# Patient Record
Sex: FEMALE | Race: WHITE | NOT HISPANIC OR LATINO | Employment: FULL TIME | URBAN - METROPOLITAN AREA
[De-identification: names, ages, dates, MRNs, and addresses within clinical notes are randomized per-mention and may not be internally consistent; named-entity substitution may affect disease eponyms.]

---

## 2021-04-27 ENCOUNTER — OFFICE VISIT (OUTPATIENT)
Dept: ENDOCRINOLOGY | Facility: HOSPITAL | Age: 46
End: 2021-04-27
Payer: COMMERCIAL

## 2021-04-27 VITALS
WEIGHT: 204.6 LBS | BODY MASS INDEX: 36.25 KG/M2 | HEART RATE: 104 BPM | HEIGHT: 63 IN | DIASTOLIC BLOOD PRESSURE: 80 MMHG | SYSTOLIC BLOOD PRESSURE: 120 MMHG

## 2021-04-27 DIAGNOSIS — E03.9 ACQUIRED HYPOTHYROIDISM: Primary | ICD-10-CM

## 2021-04-27 DIAGNOSIS — R63.5 WEIGHT GAIN: ICD-10-CM

## 2021-04-27 DIAGNOSIS — E55.9 VITAMIN D DEFICIENCY: ICD-10-CM

## 2021-04-27 DIAGNOSIS — Z86.39 HISTORY OF THYROID NODULE: ICD-10-CM

## 2021-04-27 DIAGNOSIS — L91.8 MULTIPLE ACQUIRED SKIN TAGS: ICD-10-CM

## 2021-04-27 PROBLEM — I10 ESSENTIAL HYPERTENSION: Status: ACTIVE | Noted: 2021-04-27

## 2021-04-27 PROBLEM — E78.2 MIXED HYPERLIPIDEMIA: Status: ACTIVE | Noted: 2021-04-27

## 2021-04-27 PROCEDURE — 99205 OFFICE O/P NEW HI 60 MIN: CPT | Performed by: INTERNAL MEDICINE

## 2021-04-27 RX ORDER — LOSARTAN POTASSIUM 50 MG/1
50 TABLET ORAL DAILY
COMMUNITY
Start: 2021-04-13

## 2021-04-27 RX ORDER — MOMETASONE FUROATE 50 UG/1
SPRAY, METERED NASAL EVERY 24 HOURS
COMMUNITY

## 2021-04-27 RX ORDER — MAGNESIUM 200 MG
TABLET ORAL DAILY
COMMUNITY

## 2021-04-27 RX ORDER — CHLORAL HYDRATE 500 MG
1000 CAPSULE ORAL DAILY
COMMUNITY

## 2021-04-27 RX ORDER — ZINC GLUCONATE 50 MG
50 TABLET ORAL DAILY
COMMUNITY

## 2021-04-27 RX ORDER — THYROID 60 MG
TABLET ORAL
COMMUNITY
Start: 2021-01-29 | End: 2021-05-19 | Stop reason: ALTCHOICE

## 2021-04-27 RX ORDER — BUDESONIDE AND FORMOTEROL FUMARATE DIHYDRATE 160; 4.5 UG/1; UG/1
2 AEROSOL RESPIRATORY (INHALATION) 2 TIMES DAILY
COMMUNITY

## 2021-04-27 RX ORDER — CHOLECALCIFEROL (VITAMIN D3) 125 MCG
2000 CAPSULE ORAL DAILY
COMMUNITY
End: 2021-05-19 | Stop reason: SDUPTHER

## 2021-04-27 NOTE — PROGRESS NOTES
4/28/2021    Assessment/Plan      Diagnoses and all orders for this visit:    Acquired hypothyroidism  -     HEMOGLOBIN A1C W/ EAG ESTIMATION Lab Collect; Future  -     Comprehensive metabolic panel Lab Collect; Future  -     TSH, 3rd generation Lab Collect; Future  -     T4, free Lab Collect; Future  -     Vitamin D 25 hydroxy Lab Collect; Future  -     Insulin, fasting- Lab Collect; Future  -     T3, free; Future  -     Anti-microsomal antibody  -     Anti-thyroglobulin antibody  -     US thyroid; Future    Vitamin D deficiency  -     HEMOGLOBIN A1C W/ EAG ESTIMATION Lab Collect; Future  -     Comprehensive metabolic panel Lab Collect; Future  -     TSH, 3rd generation Lab Collect; Future  -     T4, free Lab Collect; Future  -     Vitamin D 25 hydroxy Lab Collect; Future  -     Insulin, fasting- Lab Collect; Future  -     T3, free; Future  -     Anti-microsomal antibody  -     Anti-thyroglobulin antibody    Weight gain  -     HEMOGLOBIN A1C W/ EAG ESTIMATION Lab Collect; Future  -     Comprehensive metabolic panel Lab Collect; Future  -     TSH, 3rd generation Lab Collect; Future  -     T4, free Lab Collect; Future  -     Vitamin D 25 hydroxy Lab Collect; Future  -     Insulin, fasting- Lab Collect; Future  -     T3, free; Future  -     Anti-microsomal antibody  -     Anti-thyroglobulin antibody    Multiple acquired skin tags  -     HEMOGLOBIN A1C W/ EAG ESTIMATION Lab Collect; Future  -     Comprehensive metabolic panel Lab Collect; Future  -     TSH, 3rd generation Lab Collect; Future  -     T4, free Lab Collect; Future  -     Vitamin D 25 hydroxy Lab Collect; Future  -     Insulin, fasting- Lab Collect; Future  -     T3, free; Future  -     Anti-microsomal antibody  -     Anti-thyroglobulin antibody    History of thyroid nodule  -     US thyroid;  Future    Other orders  -     [place] Thyroid 60 MG tablet; TAKE 1 TABLET BY MOUTH EVERY DAY AND ONE EXTRA TABLET ONE DAY A WEEK  -     albuterol (PROVENTIL HFA,VENTOLIN HFA) 90 mcg/act inhaler  -     mometasone (Nasonex) 50 mcg/act nasal spray; every 24 hours  -     budesonide-formoterol (SYMBICORT) 160-4 5 mcg/act inhaler; Inhale 2 puffs 2 (two) times a day Rinse mouth after use  -     Cholecalciferol (Vitamin D3) 50 MCG (2000 UT) TABS; Take 2,000 Units by mouth daily  -     losartan (COZAAR) 25 mg tablet; Take 25 mg by mouth daily   -     Multiple Vitamins-Minerals (MULTIVITAMIN WOMEN PO); Take by mouth daily  -     COLLAGEN PO; Take by mouth Powder daily  -     Omega-3 Fatty Acids (fish oil) 1,000 mg; Take 1,000 mg by mouth daily  -     Lactobacillus (PROBIOTIC ACIDOPHILUS PO); Take by mouth daily  -     Magnesium 200 MG TABS; Take by mouth daily  -     zinc gluconate 50 mg tablet; Take 50 mg by mouth daily  -     Garlic 502 MG TABS; Take by mouth daily        Assessment/Plan:  1  Hypothyroidism  Last thyroid blood work was normal but she is now taking only [place] Thyroid 60 mg 1 tablet daily instead of the extra tablet once a week  She is still having hypothyroid symptoms  I will repeat thyroid function tests with thyroid antibodies now  I reassured her that some of her symptoms may not have anything to do with the thyroid as there are multiple other causes for the same symptoms including insulin resistance and vitamin-D deficiency  We discussed possibility of adjusting her thyroid hormone to brand name Synthroid her Tirosint alone or with Cytomel / liothyronine in addition or continuing on [place] Thyroid and adjusting dosage  2  Vitamin-D deficiency  I will check a vitamin-D level now as she has not had a level checked in quite some time  This can cause hair loss  3  Multiple acquired skin tags and weight gain  Given her family history of type 2 diabetes, she could have insulin resistance and I will evaluate for that problem  Metformin could be used in the future if she does indeed have insulin resistance    For now, she will continue with the nutritionist to see if that will help her lose weight and continue to work on exercise  Of note, her lipid profile does demonstrate high triglycerides with a low HDL consistent with insulin resistance and the metabolic syndrome  4  History of thyroid nodule  She reportedly had a thyroid nodule on ultrasound for which biopsy was attempted in the past   The biopsy was inconclusive so I will repeat a thyroid ultrasound at her earliest convenience  She will get blood work done now consisting of a hemoglobin A1c, fasting CMP with insulin level, TSH, free T4, free T3, antimicrosomal antibodies and antithyroglobulin antibodies, and 25 hydroxy vitamin-D level  She will also get a thyroid ultrasound done at her earliest convenience  CC:   Hypothyroid, vitamin-D deficiency, weight gain consult    History of Present Illness     HPI: Yoan Mendoza is a 39y o  year old female with history of  Hypothyroidism reportedly due to Hashimoto's thyroiditis, vitamin-D deficiency, continued hypothyroid symptoms here for evaluation /consult  She was diagnosed with a thyroid nodule around 2011 or so  This was noted by an ENT  A biopsy was performed which was inconclusive in repeat thyroid ultrasounds demonstrated the nodule had resolved  She had repeat ultrasounds but has not had 1 in quite some time  She was continuing to feel poorly and was diagnosed with hypothyroidism about 5 years or so ago  At that time, she was complaining of fatigue, hair loss, weight gain, nails not growing  She had another primary care physician who originally did the aforementioned ultrasound  When she changed primary care physicians, blood work was done and she was told she had hypothyroidism and Hashimoto's thyroiditis and was told to see an endocrinologist who placed her on thyroid hormone  She started brand-name Synthroid but never felt quite good about 2 years ago she switched [place] Thyroid    She still had continued symptoms and vitamin-D deficiency was noted  She was placed on high-dose vitamin-D for 3 months but has not continue taking vitamin-D regularly  She is supposed to be taking vitamin-D 2000 units daily  She also started collagen and that seems to have helped her eyelashes loss and her hair loss  She is here for transition of care from her previous endocrinologist     She felt that when she went on [place] Thyroid she may have had an increase in eyelashes falling out and weight gain  She would have cycles of hypothyroid symptoms that would resolve after several months and the cycles could include rashes on her arms and her side and her head with hair loss and that would improve  Of note her gynecologist ran tests in June 2020 and TSH was elevated  At that time, she was told increase her [place] Thyroid to add an extra tablet once a week but she discontinued this when her thyroid function tests were normal in January 2021  She has a family history of hypothyroidism in her brother and possibly her mother and type 2 diabetes in a maternal grandmother  She admits to gaining at least 40 lb in the last 4-5 years  She has started working with a nutritionist to try to lose weight  She has also been walking her dogs at least 2 miles a day  Unfortunately, she has frequent sinus infections and ends up utilizing a prednisone course every few months for either her wheezing or her rash  She continues to be fatigued and run down  She does have sleeping issues in that she will go to bed between 11 p m  and 12:00 a m  and then wake up early and be unable to get back to sleep  The blood told her her memory is poor  She denies anxiety or depression but can feel shaky at times  This often will occur after meals  She can get very cold but can also be sweaty when her neck and have warm feelings at night  She has rare heart racing  She denies diarrhea or constipation    She has continued hair loss and more hair breaking and hair loss along with eyelashes loss  She has brittle nails and dry skin and has noticed some increase in skin tags  Menstrual cycles are occurring about every 28 days but are quite heavy  She will have some spotting for 2 weeks before after her menses  She denies diplopia  She denies compressive thyroid symptoms or difficulties with swallowing  She has no history of head or neck irradiation in the past     Review of Systems   Constitutional: Positive for fatigue and unexpected weight change  Has started to see a nutritionist now  weight continues to climb despite dieting although not always the healthiest  Now walking dogs 2 miles a day  Always fatigued and run down  At least 40 lbs in the last 4-5 years  HENT: Negative for hearing loss, tinnitus and trouble swallowing  Has frequent sinus infections requiring prednisone usage  No XRT  To the head and neck int he past    Eyes: Negative for visual disturbance  No diplopia  Respiratory: Negative for chest tightness and shortness of breath  Cardiovascular: Negative for chest pain, palpitations and leg swelling  Rare heart racing  Gastrointestinal: Negative for abdominal pain, constipation, diarrhea and nausea  Now has GERD  Endocrine: Negative for cold intolerance and heat intolerance  Feet can get very cold  Will be sweaty around the neck and some warm feelings at night  Genitourinary:        Menses regular every 28 days but much heavier  Will start seeing 2 weeks of spotting after menses ends, had recent pelvic ultrasound  Musculoskeletal: Negative for arthralgias and back pain  Skin: Positive for rash  Getting skin tags recently around the base of the neck  Skin rashes can occur and certain things can cause rash if wrong deodorant  Has dry skin  Has brittle nails  Has hair loss and eye lash loss  Neurological: Positive for tremors and headaches  Negative for dizziness, light-headedness and numbness  Feeling more shaky at times  Has chronic headaches  Psychiatric/Behavioral: Positive for confusion and sleep disturbance  Negative for dysphoric mood  The patient is not nervous/anxious  Must wait until 11-12 to go to sleep or will wake up in the early morning  Will notice a strange noise when she lays on back to breath  Uses a"Breatherite" strip to try to open nose a bit  People have told her memory is bad  Historical Information   Past Medical History:   Diagnosis Date    Asthma     induced by exercise or smoke   Deviated septum     GERD (gastroesophageal reflux disease)     Nasal polyps      Past Surgical History:   Procedure Laterality Date     SECTION      X 2     Social History   Social History     Substance and Sexual Activity   Alcohol Use Never    Frequency: Never     Social History     Substance and Sexual Activity   Drug Use Never     Social History     Tobacco Use   Smoking Status Never Smoker   Smokeless Tobacco Never Used     Family History:   Family History   Problem Relation Age of Onset    Hypertension Mother     Hashimoto's thyroiditis Mother     Hypertension Father     Sleep apnea Father     Hypertension Brother     Thyroid disease unspecified Brother     Sleep apnea Brother     Hyperlipidemia Brother     Autism spectrum disorder Brother         suspected    No Known Problems Son     No Known Problems Daughter     Diabetes type II Maternal Grandmother        Meds/Allergies   Current Outpatient Medications   Medication Sig Dispense Refill    albuterol (PROVENTIL HFA,VENTOLIN HFA) 90 mcg/act inhaler       [place] Thyroid 60 MG tablet TAKE 1 TABLET BY MOUTH EVERY DAY AND ONE EXTRA TABLET ONE DAY A WEEK      budesonide-formoterol (SYMBICORT) 160-4 5 mcg/act inhaler Inhale 2 puffs 2 (two) times a day Rinse mouth after use        Cholecalciferol (Vitamin D3) 50 MCG (2000 UT) TABS Take 2,000 Units by mouth daily      COLLAGEN PO Take by mouth Powder daily      Garlic 534 MG TABS Take by mouth daily      Lactobacillus (PROBIOTIC ACIDOPHILUS PO) Take by mouth daily      losartan (COZAAR) 25 mg tablet Take 25 mg by mouth daily       Magnesium 200 MG TABS Take by mouth daily      mometasone (Nasonex) 50 mcg/act nasal spray every 24 hours      Multiple Vitamins-Minerals (MULTIVITAMIN WOMEN PO) Take by mouth daily      Omega-3 Fatty Acids (fish oil) 1,000 mg Take 1,000 mg by mouth daily      zinc gluconate 50 mg tablet Take 50 mg by mouth daily       No current facility-administered medications for this visit  Allergies   Allergen Reactions    Sulfamethoxazole-Trimethoprim Hives       Objective   Vitals: Blood pressure 120/80, pulse 104, height 5' 2 5" (1 588 m), weight 92 8 kg (204 lb 9 6 oz)  Invasive Devices     None                 Physical Exam  Vitals signs reviewed  Constitutional:       Appearance: Normal appearance  She is well-developed  She is obese  HENT:      Head: Normocephalic and atraumatic  Eyes:      Extraocular Movements: Extraocular movements intact  Conjunctiva/sclera: Conjunctivae normal       Comments: No lid lag, stare, proptosis, or periorbital edema  Neck:      Musculoskeletal: Normal range of motion and neck supple  Thyroid: No thyromegaly  Vascular: No carotid bruit  Comments: No supraclavicular fat pad or buffalo hump  Thyroid normal in size  No palpable thyroid nodules  Cardiovascular:      Rate and Rhythm: Normal rate and regular rhythm  Heart sounds: Normal heart sounds  No murmur  Pulmonary:      Effort: Pulmonary effort is normal       Breath sounds: Normal breath sounds  No wheezing  Abdominal:      General: Bowel sounds are normal       Palpations: Abdomen is soft  Tenderness: There is no abdominal tenderness  Musculoskeletal: Normal range of motion  General: No deformity  Right lower leg: No edema  Left lower leg: No edema        Comments: No tremor of the outstretched hands  No spinous process tenderness  No CVA tenderness  No tremor of the outstretched hands  Lymphadenopathy:      Cervical: No cervical adenopathy  Skin:     General: Skin is warm and dry  Findings: No rash  Comments: Acanthosis nigricans in the nape of her neck  Skin tags in the nape of her neck  No violaceous striae  Neurological:      Mental Status: She is alert and oriented to person, place, and time  Deep Tendon Reflexes: Reflexes are normal and symmetric  Comments: Deep tendon reflexes normal          The history was obtained from the review of the chart and from the patient  Lab Results:        Blood work done on 01/07/2021 showed a TSH of 2 45, total T4 4 5, T3 uptake 25%,  FTI 1 1  CBC was normal with a ferritin that is low normal at 26  TIBC and iron levels are normal      twenty-five hydroxy vitamin-D level is 21 4  Total cholesterol 230, triglyceride 406, HDL 30, LDL seventy-three  CMP showed a glucose of 96 fasting with a calcium of 9 4 but was otherwise normal       No future appointments

## 2021-04-27 NOTE — PATIENT INSTRUCTIONS
Let's get blood work done now for the thyroid and the possible insulin resistance  Let's do blood work now ,   Continue with the nutritionist   For now, continue the [place] thyroid 60 mg daily  Follow up to be determined

## 2021-05-17 LAB
25(OH)D3+25(OH)D2 SERPL-MCNC: 22 NG/ML (ref 30–100)
ALBUMIN SERPL-MCNC: 4.4 G/DL (ref 3.8–4.8)
ALBUMIN/GLOB SERPL: 1.8 {RATIO} (ref 1.2–2.2)
ALP SERPL-CCNC: 65 IU/L (ref 39–117)
ALT SERPL-CCNC: 17 IU/L (ref 0–32)
AST SERPL-CCNC: 21 IU/L (ref 0–40)
BILIRUB SERPL-MCNC: 0.6 MG/DL (ref 0–1.2)
BUN SERPL-MCNC: 14 MG/DL (ref 6–24)
BUN/CREAT SERPL: 21 (ref 9–23)
CALCIUM SERPL-MCNC: 9.1 MG/DL (ref 8.7–10.2)
CHLORIDE SERPL-SCNC: 104 MMOL/L (ref 96–106)
CO2 SERPL-SCNC: 22 MMOL/L (ref 20–29)
CREAT SERPL-MCNC: 0.66 MG/DL (ref 0.57–1)
EST. AVERAGE GLUCOSE BLD GHB EST-MCNC: 108 MG/DL
GLOBULIN SER-MCNC: 2.5 G/DL (ref 1.5–4.5)
GLUCOSE SERPL-MCNC: 86 MG/DL (ref 65–99)
HBA1C MFR BLD: 5.4 % (ref 4.8–5.6)
INSULIN SERPL-ACNC: 31 UIU/ML (ref 2.6–24.9)
POTASSIUM SERPL-SCNC: 4.2 MMOL/L (ref 3.5–5.2)
PROT SERPL-MCNC: 6.9 G/DL (ref 6–8.5)
SL AMB EGFR AFRICAN AMERICAN: 123 ML/MIN/1.73
SL AMB EGFR NON AFRICAN AMERICAN: 107 ML/MIN/1.73
SODIUM SERPL-SCNC: 138 MMOL/L (ref 134–144)
T3FREE SERPL-MCNC: 4.5 PG/ML (ref 2–4.4)
T4 FREE SERPL-MCNC: 0.89 NG/DL (ref 0.82–1.77)
THYROGLOB AB SERPL-ACNC: 10.5 IU/ML (ref 0–0.9)
THYROPEROXIDASE AB SERPL-ACNC: 351 IU/ML (ref 0–34)
TSH SERPL DL<=0.005 MIU/L-ACNC: 4.16 UIU/ML (ref 0.45–4.5)

## 2021-05-18 ENCOUNTER — TELEPHONE (OUTPATIENT)
Dept: ENDOCRINOLOGY | Facility: HOSPITAL | Age: 46
End: 2021-05-18

## 2021-05-18 NOTE — TELEPHONE ENCOUNTER
We should have her mckinley an appointment with me to discuss results and treatment as there are many abnormalities and treatment options to discuss

## 2021-05-19 ENCOUNTER — TELEMEDICINE (OUTPATIENT)
Dept: ENDOCRINOLOGY | Facility: HOSPITAL | Age: 46
End: 2021-05-19
Payer: COMMERCIAL

## 2021-05-19 DIAGNOSIS — E03.8 HYPOTHYROIDISM DUE TO HASHIMOTO'S THYROIDITIS: Primary | ICD-10-CM

## 2021-05-19 DIAGNOSIS — E06.3 HYPOTHYROIDISM DUE TO HASHIMOTO'S THYROIDITIS: Primary | ICD-10-CM

## 2021-05-19 DIAGNOSIS — E55.9 VITAMIN D DEFICIENCY: ICD-10-CM

## 2021-05-19 DIAGNOSIS — E88.81 INSULIN RESISTANCE: ICD-10-CM

## 2021-05-19 PROBLEM — E88.819 INSULIN RESISTANCE: Status: ACTIVE | Noted: 2021-05-19

## 2021-05-19 PROCEDURE — 99214 OFFICE O/P EST MOD 30 MIN: CPT | Performed by: INTERNAL MEDICINE

## 2021-05-19 RX ORDER — OMEPRAZOLE 20 MG/1
20 CAPSULE, DELAYED RELEASE ORAL DAILY
COMMUNITY

## 2021-05-19 RX ORDER — LEVOTHYROXINE SODIUM 100 MCG
100 TABLET ORAL DAILY
Qty: 90 TABLET | Refills: 3 | Status: SHIPPED | OUTPATIENT
Start: 2021-05-19 | End: 2022-06-09

## 2021-05-19 RX ORDER — CHOLECALCIFEROL (VITAMIN D3) 125 MCG
CAPSULE ORAL
Start: 2021-05-19 | End: 2021-08-30 | Stop reason: SDUPTHER

## 2021-05-19 RX ORDER — LIOTHYRONINE SODIUM 5 UG/1
5 TABLET ORAL DAILY
Qty: 90 TABLET | Refills: 3 | Status: SHIPPED | OUTPATIENT
Start: 2021-05-19 | End: 2022-06-09

## 2021-05-19 NOTE — PATIENT INSTRUCTIONS
The thyroid is on the underactive side  Let's change to brand synthroid 100 mcg daily and cytomel/liothyronine 5 mcg daily  Let's increase the vitamin D to 3331-2883 units daily  Work on diet, exercise, and weight loss  Follow up in 3 months with blood work

## 2021-05-19 NOTE — PROGRESS NOTES
Virtual Regular Visit      Assessment/Plan:    Problem List Items Addressed This Visit        Endocrine    Hypothyroidism due to Hashimoto's thyroiditis - Primary    Relevant Medications    Synthroid 100 MCG tablet    liothyronine (CYTOMEL) 5 mcg tablet    Other Relevant Orders    T4, free Lab Collect    Comprehensive metabolic panel Lab Collect    TSH, 3rd generation Lab Collect    Vitamin D 25 hydroxy Lab Collect    T3, free    Lipid Panel with Direct LDL reflex Lab Collect    Insulin resistance    Relevant Orders    T4, free Lab Collect    Comprehensive metabolic panel Lab Collect    TSH, 3rd generation Lab Collect    Vitamin D 25 hydroxy Lab Collect    T3, free    Lipid Panel with Direct LDL reflex Lab Collect       Other    Vitamin D deficiency    Relevant Medications    Cholecalciferol (Vitamin D3) 50 MCG (2000 UT) TABS    Other Relevant Orders    T4, free Lab Collect    Comprehensive metabolic panel Lab Collect    TSH, 3rd generation Lab Collect    Vitamin D 25 hydroxy Lab Collect    T3, free    Lipid Panel with Direct LDL reflex Lab Collect          Assessment and plan:     1  Hypothyroidism  She has hypothyroidism on the basis of Hashimoto's thyroiditis as evidence by her positive thyroid antibodies  She is currently on [place] Thyroid but not feeling well and has not felt well on thyroid hormone replacement since she started thyroid hormone  Her TSH is in the high end of normal which is in the hypothyroid side of normal and perhaps this is the reason  We are going to try switching her thyroid hormone regimen and change to a T4/T3 combination with separate the pills to see if we can adjust each individual component of her thyroid hormone replacement individually  To that end, I will switch her to brand name Synthroid 100 mcg daily and Cytomel 5 mcg daily    ( of note, by calculation of weight, she may need Synthroid 125 mcg daily but we discussed this in air to on the side of slightly lower so she does not have hyperthyroid symptoms  )    2  Vitamin-D deficiency  She continues to have a low vitamin-D and is taking intermittent vitamin-D 2000 units daily  She was asked to increase her vitamin-D to 5137-1694 units daily  3  Insulin resistance  Based on her blood work, she does have insulin resistance which could be partly due to her weight  We discussed changes in habits by restricting carbohydrates and portions  Working on exercising more  We also discussed briefly that she could try metformin but we have elected to hold on doing this at this point as we have elected to try adjusting her thyroid hormone 1st which may help in her weight loss  I have asked her to follow up in 3 months with preceding CMP, lipid panel, TSH, free T4, free T3, and 25 hydroxy vitamin-D level  100% of the visit was spent in discussing her testing results, potential treatments, and answering all of her questions  Reason for visit is   Chief Complaint   Patient presents with    Virtual Regular Visit     And hypothyroidism, insulin resistance, vitamin-D deficiency follow-up    Encounter provider Anali Ramos MD    Provider located at [address] [place] [address] [address] [place] [phone]      Recent Visits  Date Type Provider Dept   05/19/21 Telemedicine Anali Ramos MD [facility]   05/18/21 Telephone Nancy Torres [facility] Emy richardson   Showing recent visits within past 7 days and meeting all other requirements     Future Appointments  No visits were found meeting these conditions  Showing future appointments within next 150 days and meeting all other requirements        The patient was identified by name and date of birth  João Aguilar was informed that this is a telemedicine visit and that the visit is being conducted through telephone    My office door was closed  No one else was in the room  She acknowledged consent and understanding of privacy and security of the video platform  The patient has agreed to participate and understands they can discontinue the visit at any time  Patient is aware this is a billable service  Shae Benavides is a 39 y o  female with a history of hypothyroidism and vitamin-D deficiency along with weight gain for follow-up visit via telephone  telemedicine   She has this visit to go over the testing results performed for the above problems  She reports that she has had thyroid hormone replacement with brand-name Synthroid in the past in [place] Thyroid but has never felt good on the medications and feels no different  She has also tried vitamin-D replacement and has felt no different  HPI     Past Medical History:   Diagnosis Date    Asthma     induced by exercise or smoke   Deviated septum     GERD (gastroesophageal reflux disease)     Nasal polyps        Past Surgical History:   Procedure Laterality Date     SECTION      X 2       Current Outpatient Medications   Medication Sig Dispense Refill    albuterol (PROVENTIL HFA,VENTOLIN HFA) 90 mcg/act inhaler       budesonide-formoterol (SYMBICORT) 160-4 5 mcg/act inhaler Inhale 2 puffs 2 (two) times a day Rinse mouth after use        Cholecalciferol (Vitamin D3) 50 MCG (2000 UT) TABS 4320-4792 units daily      COLLAGEN PO Take by mouth Powder daily      Garlic 398 MG TABS Take by mouth daily      Lactobacillus (PROBIOTIC ACIDOPHILUS PO) Take by mouth daily      losartan (COZAAR) 25 mg tablet Take 25 mg by mouth daily       Magnesium 200 MG TABS Take by mouth daily      mometasone (Nasonex) 50 mcg/act nasal spray every 24 hours      Multiple Vitamins-Minerals (MULTIVITAMIN WOMEN PO) Take by mouth daily      Omega-3 Fatty Acids (fish oil) 1,000 mg Take 1,000 mg by mouth daily      omeprazole (PriLOSEC) 20 mg delayed release capsule Take 20 mg by mouth daily      zinc gluconate 50 mg tablet Take 50 mg by mouth daily      liothyronine (CYTOMEL) 5 mcg tablet Take 1 tablet (5 mcg total) by mouth daily 90 tablet 3    Synthroid 100 MCG tablet Take 1 tablet (100 mcg total) by mouth daily 90 tablet 3     No current facility-administered medications for this visit  Allergies   Allergen Reactions    Sulfamethoxazole-Trimethoprim Hives       Review of Systems    Video Exam  It was my intent to perform this visit via video technology but the patient was not able to do a video connection so the visit was completed via audio telephone only  There were no vitals filed for this visit  Physical Exam was not performed as this was a telephone telemedicine visit  Blood work:      Blood work done on 05/14/2021 showed a TSH of 4 16 with a free T4 of  0 89 and a free T3 of  4 5  Thyroglobulin antibodies are 10 5 and thyroid microsomal antibodies are 351  CMP showed a glucose of 86 fasting but was otherwise normal   Fasting insulin level was elevated at 31  Hemoglobin A1c is 5 4%  Twenty-five hydroxy vitamin-D level is 22  I spent 35 minutes directly with the patient during this visit      VIRTUAL VISIT DISCLAIMER    Galilea Raymundo acknowledges that she has consented to an online visit or consultation  She understands that the online visit is based solely on information provided by her, and that, in the absence of a face-to-face physical evaluation by the physician, the diagnosis she receives is both limited and provisional in terms of accuracy and completeness  This is not intended to replace a full medical face-to-face evaluation by the physician  Galilea Raymundo understands and accepts these terms

## 2021-08-30 ENCOUNTER — TELEMEDICINE (OUTPATIENT)
Dept: ENDOCRINOLOGY | Facility: HOSPITAL | Age: 46
End: 2021-08-30
Payer: COMMERCIAL

## 2021-08-30 DIAGNOSIS — E03.8 HYPOTHYROIDISM DUE TO HASHIMOTO'S THYROIDITIS: Primary | ICD-10-CM

## 2021-08-30 DIAGNOSIS — E55.9 VITAMIN D DEFICIENCY: ICD-10-CM

## 2021-08-30 DIAGNOSIS — E06.3 HYPOTHYROIDISM DUE TO HASHIMOTO'S THYROIDITIS: Primary | ICD-10-CM

## 2021-08-30 DIAGNOSIS — E88.81 INSULIN RESISTANCE: ICD-10-CM

## 2021-08-30 LAB
25(OH)D3+25(OH)D2 SERPL-MCNC: 24.9 NG/ML (ref 30–100)
ALBUMIN SERPL-MCNC: 4.4 G/DL (ref 3.8–4.8)
ALBUMIN/GLOB SERPL: 1.8 {RATIO} (ref 1.2–2.2)
ALP SERPL-CCNC: 71 IU/L (ref 48–121)
ALT SERPL-CCNC: 18 IU/L (ref 0–32)
AST SERPL-CCNC: 19 IU/L (ref 0–40)
BILIRUB SERPL-MCNC: 0.6 MG/DL (ref 0–1.2)
BUN SERPL-MCNC: 15 MG/DL (ref 6–24)
BUN/CREAT SERPL: 19 (ref 9–23)
CALCIUM SERPL-MCNC: 9.2 MG/DL (ref 8.7–10.2)
CHLORIDE SERPL-SCNC: 105 MMOL/L (ref 96–106)
CO2 SERPL-SCNC: 23 MMOL/L (ref 20–29)
CREAT SERPL-MCNC: 0.77 MG/DL (ref 0.57–1)
GLOBULIN SER-MCNC: 2.5 G/DL (ref 1.5–4.5)
GLUCOSE SERPL-MCNC: 84 MG/DL (ref 65–99)
POTASSIUM SERPL-SCNC: 4.1 MMOL/L (ref 3.5–5.2)
PROT SERPL-MCNC: 6.9 G/DL (ref 6–8.5)
SL AMB EGFR AFRICAN AMERICAN: 108 ML/MIN/1.73
SL AMB EGFR NON AFRICAN AMERICAN: 94 ML/MIN/1.73
SODIUM SERPL-SCNC: 140 MMOL/L (ref 134–144)
T3FREE SERPL-MCNC: 3.3 PG/ML (ref 2–4.4)
T4 FREE SERPL-MCNC: 1.35 NG/DL (ref 0.82–1.77)
TSH SERPL DL<=0.005 MIU/L-ACNC: 0.88 UIU/ML (ref 0.45–4.5)

## 2021-08-30 PROCEDURE — 99214 OFFICE O/P EST MOD 30 MIN: CPT | Performed by: INTERNAL MEDICINE

## 2021-08-30 RX ORDER — MULTIVIT-MIN/IRON/FOLIC ACID/K 18-600-40
CAPSULE ORAL DAILY
COMMUNITY

## 2021-08-30 RX ORDER — CHOLECALCIFEROL (VITAMIN D3) 125 MCG
CAPSULE ORAL
Start: 2021-08-30

## 2021-08-30 NOTE — PATIENT INSTRUCTIONS
The thyroid blood work is normal   I do not think this is the cause of your symptoms at this point  Continue the same Synthroid and liothyronine for few more months  Your vitamin-D level is still low which could be contributing to the fatigue in the hair loss  Lets increase the vitamin-D to between 6 and 8000 units a day, you could use liquid vitamin-D if he need to  Continue the vitamin-C with vitamin-D 1000 units also  Follow-up in 3 months with blood work  Remember to get plenty of exercise regularly and a lower carbohydrate diet

## 2021-08-30 NOTE — PROGRESS NOTES
Virtual Regular Visit    Verification of patient location:    Patient is located in the following state in which I hold an active license [place]      Assessment/Plan:    Problem List Items Addressed This Visit        Endocrine    Hypothyroidism due to Hashimoto's thyroiditis - Primary    Relevant Orders    HEMOGLOBIN A1C W/ EAG ESTIMATION Lab Collect    Comprehensive metabolic panel Lab Collect    TSH, 3rd generation Lab Collect    T4, free Lab Collect    Vitamin D 25 hydroxy Lab Collect    T3, free    Insulin, fasting- Lab Collect    Insulin resistance    Relevant Orders    HEMOGLOBIN A1C W/ EAG ESTIMATION Lab Collect    Comprehensive metabolic panel Lab Collect    TSH, 3rd generation Lab Collect    T4, free Lab Collect    Vitamin D 25 hydroxy Lab Collect    T3, free    Insulin, fasting- Lab Collect       Other    Vitamin D deficiency    Relevant Medications    Cholecalciferol (Vitamin D3) 50 MCG (2000 UT) TABS    Other Relevant Orders    HEMOGLOBIN A1C W/ EAG ESTIMATION Lab Collect    Comprehensive metabolic panel Lab Collect    TSH, 3rd generation Lab Collect    T4, free Lab Collect    Vitamin D 25 hydroxy Lab Collect    T3, free    Insulin, fasting- Lab Collect         Assessment and plan:     1  Hypothyroidism due to Hashimoto's thyroiditis  Most recent thyroid function tests are normal   Her TSH is where it needs to be for biochemical euthyroidism  She is starting to feel slightly improved with a little bit more energy and improved eyelashes but still has a lot of hypothyroid symptoms  I have asked her to give it a little bit more time since her thyroid function tests of finally normalized  For now, she will continue the same Synthroid brand 100 mcg daily and liothyronine 5 mcg daily      Of note, she had an event which sounds like a vasovagal response to some sort of GI upset whether it is from undiagnosed irritable bowel syndrome a or something she ate, she is going to discuss this with her primary care physician in the future  2  Vitamin-D deficiency  Her vitamin-D level is still low despite utilizing 9508-3752 units of vitamin-D daily  I have asked her to increase to 8087-5509 units vitamin-D daily and continue the vitamin-C with vitamin-D 1000 units in it  3  Insulin resistance  She needs to work on regular exercise and lower carbohydrate diet  I have asked her to follow up in 3 months with preceding fasting CMP, insulin level, hemoglobin A1c, 25 hydroxy vitamin-D level, TSH, free T4, and free T3  Reason for visit is   Chief Complaint   Patient presents with    Virtual Regular Visit      And hypothyroid, insulin resistance, vitamin-D deficiency follow-up    Encounter provider Pari Singleton MD    Provider located at [address] [place] [address] [address] [place] [id number]      Recent Visits  No visits were found meeting these conditions  Showing recent visits within past 7 days and meeting all other requirements  Today's Visits  Date Type Provider Dept   08/30/21 Telemedicine Pari Singleton MD [facility]   Showing today's visits and meeting all other requirements  Future Appointments  No visits were found meeting these conditions  Showing future appointments within next 150 days and meeting all other requirements       The patient was identified by name and date of birth  Lewis Kelsey was informed that this is a telemedicine visit and that the visit is being conducted through [facility] [place] and patient was informed that this is not a secure, HIPAA-compliant platform  She agrees to proceed     My office door was closed  No one else was in the room  She acknowledged consent and understanding of privacy and security of the video platform  The patient has agreed to participate and understands they can discontinue the visit at any time      Patient is aware this is a billable skylar Berger is a 39 y o  female with a history of hypothyroidism due to Hashimoto's thyroiditis, vitamin-D deficiency, and insulin resistance for follow-up visit via video telemedicine   She has hypothyroidism due to Hashimoto's thyroiditis diagnosed around 2011  She has been on thyroid hormone replacement and most recently was on Armor Thyroid 60 mg daily  She was switched from this to Synthroid and liothyronine combination and is currently on Synthroid 100 mcg daily and liothyronine 5 mcg daily since summer 2021  She has noticed an improvement in her eyelashes as they have grown again  She is a bit more energy once she gets going but still feels fatigued and thinks higher vitamin-D dosages seem to help  She still has generalized hair loss on the scalp and some dry skin but no brittle nails  She has feelings of being somewhat hot and sweaty and will often wake up sweating  She has rare palpitations with some heart rate variability in bed  She has anxiety which can wake her in the morning as she is stressed with COVID situation and her kids being in school  She denies cold intolerance, diarrhea or constipation, depression, or weight changes  She denies tremors  Menstrual cycles do normally occur on a regular monthly basis but over the last 2 cycles, the cycle has been longer in length  She has a history of thyroid nodules with biopsies done by ENT which were inconclusive in the nodules have since resolved on repeat ultrasounds  She has vitamin-D deficiency and takes vitamin-D 3227-1391 units a day with vitamin-C with a 1000 units daily for a total of 0722-4673 units daily  She generally feels that she has less fatigue when she takes more vitamin-D replacement  She has some insulin resistance and currently weight is stable  Menstrual cycles though have become longer in length      Of note, she reports an episode last week when she was outside standing and suddenly felt nauseated  She did not think she could make it home she felt she had to have a bowel movement so ended up stopping it [facility]  She had near loss of consciousness and dripping with sweat had a late down on the floor of the bathroom in [facility]  She then had severe diarrhea  It took about 15-20 minutes before resolved and then she felt fine thereafter  This is never happened to her before  HPI     Past Medical History:   Diagnosis Date    Asthma     induced by exercise or smoke   Deviated septum     GERD (gastroesophageal reflux disease)     Nasal polyps        Past Surgical History:   Procedure Laterality Date     SECTION      X 2       Current Outpatient Medications   Medication Sig Dispense Refill    albuterol (PROVENTIL HFA,VENTOLIN HFA) 90 mcg/act inhaler       Ascorbic Acid (Vitamin C) 500 MG CAPS Take by mouth daily With vitamin D 1000 units and zinc      budesonide-formoterol (SYMBICORT) 160-4 5 mcg/act inhaler Inhale 2 puffs 2 (two) times a day Rinse mouth after use        Cholecalciferol (Vitamin D3) 50 MCG (2000 UT) TABS 9441-2088 units daily      COLLAGEN PO Take by mouth Powder daily      Garlic 991 MG TABS Take by mouth daily      Lactobacillus (PROBIOTIC ACIDOPHILUS PO) Take by mouth daily      liothyronine (CYTOMEL) 5 mcg tablet Take 1 tablet (5 mcg total) by mouth daily 90 tablet 3    losartan (COZAAR) 25 mg tablet Take 25 mg by mouth daily       Magnesium 200 MG TABS Take by mouth daily      mometasone (Nasonex) 50 mcg/act nasal spray every 24 hours      Multiple Vitamins-Minerals (MULTIVITAMIN WOMEN PO) Take by mouth daily      Omega-3 Fatty Acids (fish oil) 1,000 mg Take 1,000 mg by mouth daily      omeprazole (PriLOSEC) 20 mg delayed release capsule Take 20 mg by mouth daily      Synthroid 100 MCG tablet Take 1 tablet (100 mcg total) by mouth daily 90 tablet 3    zinc gluconate 50 mg tablet Take 50 mg by mouth daily       No current facility-administered medications for this visit  Allergies   Allergen Reactions    Sulfamethoxazole-Trimethoprim Hives       Review of Systems   Constitutional: Positive for fatigue  Negative for unexpected weight change  Energy level slightly improved  This is particularly once she gets started in the morning  Still feels very fatigued but the fatigue is improved when she increases her vitamin-D replacement  HENT: Negative for trouble swallowing  Eyes: Negative for visual disturbance  No diplopia  Respiratory: Negative for chest tightness and shortness of breath  Cardiovascular: Positive for palpitations  Negative for chest pain  Feels heart rate is a bit higher and will have rare palpitations  Gastrointestinal: Positive for abdominal pain, diarrhea and nausea  Negative for constipation  Has some more diarrhea in general as been using magnesium more for restless leg syndrome symptoms  Endocrine: Positive for heat intolerance  Negative for cold intolerance  Feels more hot and sweaty in general and will occasionally wake up sweating  Genitourinary:        Menstrual cycles typically occur on a regular monthly basis  Last 2 menses have lasted a bit longer in flow  Skin: Positive for rash  Has significant hair loss till  Eyelashes though have started to grow and improved  Has dry skin  No brittle nails  Has a rash on the posterior scalp  Neurological: Positive for numbness and headaches  Negative for dizziness, tremors and light-headedness  Numbness of the left 5th toe  Always has a headache  Psychiatric/Behavioral: Positive for sleep disturbance  Negative for dysphoric mood  The patient is nervous/anxious  Anxiety which tends to wake her in the morning  Under lot of stress with the COVID situation and a kids being in school over the last month  Video Exam    There were no vitals filed for this visit      Physical Exam Physical Exam   Constitutional: She is oriented to person, place, and time  She appears well-developed and well-nourished  No distress  HENT:   No lid lag, stare, proptosis, or periorbital edema  No moon faces  Head: Normocephalic and atraumatic  Neck: Normal range of motion  No obvious thyroid enlargement  Pulmonary/Chest: Effort normal    No audible wheezing  Musculoskeletal: Normal range of motion  Neurological: She is alert and oriented to person, place, and time  Skin: She is not diaphoretic  Psychiatric: She has a normal mood and affect  Her behavior is normal      Blood work:     Blood work done on 08/27/2021 showed a TSH of 0 88 with a free T4 of 1 35 and a free T3 of 3 3  Twenty-five hydroxy vitamin-D level is 24 9  CMP showed a glucose of 84 but was otherwise normal        I spent 35 minutes directly with the patient during this visit    [id number] Kelli Pacheco verbally agrees to participate in [employer]  Pt is aware that [employer] could be limited without vital signs or the ability to perform a full hands-on physical Edythe Moritz understands she or the provider may request at any time to terminate the video visit and request the patient to seek care or treatment in person

## 2021-12-06 ENCOUNTER — OFFICE VISIT (OUTPATIENT)
Dept: ENDOCRINOLOGY | Facility: HOSPITAL | Age: 46
End: 2021-12-06
Payer: COMMERCIAL

## 2021-12-06 VITALS
SYSTOLIC BLOOD PRESSURE: 138 MMHG | WEIGHT: 207.8 LBS | HEIGHT: 63 IN | DIASTOLIC BLOOD PRESSURE: 86 MMHG | BODY MASS INDEX: 36.82 KG/M2 | HEART RATE: 90 BPM

## 2021-12-06 DIAGNOSIS — Z86.39 HISTORY OF THYROID NODULE: ICD-10-CM

## 2021-12-06 DIAGNOSIS — E03.8 HYPOTHYROIDISM DUE TO HASHIMOTO'S THYROIDITIS: Primary | ICD-10-CM

## 2021-12-06 DIAGNOSIS — L65.9 HAIR LOSS: ICD-10-CM

## 2021-12-06 DIAGNOSIS — R63.5 WEIGHT GAIN: ICD-10-CM

## 2021-12-06 DIAGNOSIS — E88.81 INSULIN RESISTANCE: ICD-10-CM

## 2021-12-06 DIAGNOSIS — E55.9 VITAMIN D DEFICIENCY: ICD-10-CM

## 2021-12-06 DIAGNOSIS — E06.3 HYPOTHYROIDISM DUE TO HASHIMOTO'S THYROIDITIS: Primary | ICD-10-CM

## 2021-12-06 LAB
25(OH)D3+25(OH)D2 SERPL-MCNC: 24.1 NG/ML (ref 30–100)
ALBUMIN SERPL-MCNC: 4.3 G/DL (ref 3.8–4.8)
ALBUMIN/GLOB SERPL: 1.7 {RATIO} (ref 1.2–2.2)
ALP SERPL-CCNC: 68 IU/L (ref 44–121)
ALT SERPL-CCNC: 25 IU/L (ref 0–32)
AST SERPL-CCNC: 26 IU/L (ref 0–40)
BILIRUB SERPL-MCNC: 0.7 MG/DL (ref 0–1.2)
BUN SERPL-MCNC: 15 MG/DL (ref 6–24)
BUN/CREAT SERPL: 20 (ref 9–23)
CALCIUM SERPL-MCNC: 9.1 MG/DL (ref 8.7–10.2)
CHLORIDE SERPL-SCNC: 102 MMOL/L (ref 96–106)
CO2 SERPL-SCNC: 22 MMOL/L (ref 20–29)
CREAT SERPL-MCNC: 0.76 MG/DL (ref 0.57–1)
EST. AVERAGE GLUCOSE BLD GHB EST-MCNC: 117 MG/DL
GLOBULIN SER-MCNC: 2.6 G/DL (ref 1.5–4.5)
GLUCOSE SERPL-MCNC: 85 MG/DL (ref 65–99)
HBA1C MFR BLD: 5.7 % (ref 4.8–5.6)
INSULIN SERPL-ACNC: 22.9 UIU/ML (ref 2.6–24.9)
POTASSIUM SERPL-SCNC: 4.1 MMOL/L (ref 3.5–5.2)
PROT SERPL-MCNC: 6.9 G/DL (ref 6–8.5)
SL AMB EGFR AFRICAN AMERICAN: 110 ML/MIN/1.73
SL AMB EGFR NON AFRICAN AMERICAN: 95 ML/MIN/1.73
SODIUM SERPL-SCNC: 138 MMOL/L (ref 134–144)
T3FREE SERPL-MCNC: 2.9 PG/ML (ref 2–4.4)
T4 FREE SERPL-MCNC: 0.95 NG/DL (ref 0.82–1.77)
TSH SERPL DL<=0.005 MIU/L-ACNC: 1.42 UIU/ML (ref 0.45–4.5)

## 2021-12-06 PROCEDURE — 99214 OFFICE O/P EST MOD 30 MIN: CPT | Performed by: INTERNAL MEDICINE

## 2021-12-06 RX ORDER — METFORMIN HYDROCHLORIDE 500 MG/1
500 TABLET, EXTENDED RELEASE ORAL 2 TIMES DAILY WITH MEALS
Qty: 180 TABLET | Refills: 2 | Status: SHIPPED | OUTPATIENT
Start: 2021-12-06

## 2022-03-24 LAB
ALBUMIN SERPL-MCNC: 4.5 G/DL (ref 3.8–4.8)
ALBUMIN/GLOB SERPL: 1.7 {RATIO} (ref 1.2–2.2)
ALP SERPL-CCNC: 69 IU/L (ref 44–121)
ALT SERPL-CCNC: 18 IU/L (ref 0–32)
AST SERPL-CCNC: 20 IU/L (ref 0–40)
BASOPHILS # BLD AUTO: 0.1 X10E3/UL (ref 0–0.2)
BASOPHILS NFR BLD AUTO: 1 %
BILIRUB SERPL-MCNC: 0.8 MG/DL (ref 0–1.2)
BUN SERPL-MCNC: 16 MG/DL (ref 6–24)
BUN/CREAT SERPL: 20 (ref 9–23)
CALCIUM SERPL-MCNC: 9.6 MG/DL (ref 8.7–10.2)
CHLORIDE SERPL-SCNC: 101 MMOL/L (ref 96–106)
CO2 SERPL-SCNC: 22 MMOL/L (ref 20–29)
CREAT SERPL-MCNC: 0.79 MG/DL (ref 0.57–1)
EGFR: 93 ML/MIN/1.73
EOSINOPHIL # BLD AUTO: 0.7 X10E3/UL (ref 0–0.4)
EOSINOPHIL NFR BLD AUTO: 9 %
ERYTHROCYTE [DISTWIDTH] IN BLOOD BY AUTOMATED COUNT: 13.4 % (ref 11.7–15.4)
EST. AVERAGE GLUCOSE BLD GHB EST-MCNC: 114 MG/DL
FERRITIN SERPL-MCNC: 14 NG/ML (ref 15–150)
GLOBULIN SER-MCNC: 2.6 G/DL (ref 1.5–4.5)
GLUCOSE SERPL-MCNC: 81 MG/DL (ref 65–99)
HBA1C MFR BLD: 5.6 % (ref 4.8–5.6)
HCT VFR BLD AUTO: 36.6 % (ref 34–46.6)
HGB BLD-MCNC: 12.4 G/DL (ref 11.1–15.9)
IMM GRANULOCYTES # BLD: 0 X10E3/UL (ref 0–0.1)
IMM GRANULOCYTES NFR BLD: 0 %
INSULIN SERPL-ACNC: 11.6 UIU/ML (ref 2.6–24.9)
LYMPHOCYTES # BLD AUTO: 3 X10E3/UL (ref 0.7–3.1)
LYMPHOCYTES NFR BLD AUTO: 37 %
MCH RBC QN AUTO: 27.4 PG (ref 26.6–33)
MCHC RBC AUTO-ENTMCNC: 33.9 G/DL (ref 31.5–35.7)
MCV RBC AUTO: 81 FL (ref 79–97)
MONOCYTES # BLD AUTO: 0.5 X10E3/UL (ref 0.1–0.9)
MONOCYTES NFR BLD AUTO: 7 %
NEUTROPHILS # BLD AUTO: 3.7 X10E3/UL (ref 1.4–7)
NEUTROPHILS NFR BLD AUTO: 46 %
PLATELET # BLD AUTO: 335 X10E3/UL (ref 150–450)
POTASSIUM SERPL-SCNC: 4.1 MMOL/L (ref 3.5–5.2)
PROT SERPL-MCNC: 7.1 G/DL (ref 6–8.5)
RBC # BLD AUTO: 4.52 X10E6/UL (ref 3.77–5.28)
SODIUM SERPL-SCNC: 138 MMOL/L (ref 134–144)
T3FREE SERPL-MCNC: 3.2 PG/ML (ref 2–4.4)
T4 FREE SERPL-MCNC: 1.07 NG/DL (ref 0.82–1.77)
TSH SERPL DL<=0.005 MIU/L-ACNC: 2.55 UIU/ML (ref 0.45–4.5)
WBC # BLD AUTO: 8 X10E3/UL (ref 3.4–10.8)

## 2022-03-30 ENCOUNTER — OFFICE VISIT (OUTPATIENT)
Dept: ENDOCRINOLOGY | Facility: HOSPITAL | Age: 47
End: 2022-03-30
Payer: COMMERCIAL

## 2022-03-30 VITALS
BODY MASS INDEX: 36.68 KG/M2 | HEART RATE: 98 BPM | OXYGEN SATURATION: 98 % | WEIGHT: 207 LBS | SYSTOLIC BLOOD PRESSURE: 112 MMHG | DIASTOLIC BLOOD PRESSURE: 78 MMHG | HEIGHT: 63 IN

## 2022-03-30 DIAGNOSIS — E88.81 INSULIN RESISTANCE: ICD-10-CM

## 2022-03-30 DIAGNOSIS — E06.3 HYPOTHYROIDISM DUE TO HASHIMOTO'S THYROIDITIS: Primary | ICD-10-CM

## 2022-03-30 DIAGNOSIS — R79.0 LOW FERRITIN: ICD-10-CM

## 2022-03-30 DIAGNOSIS — L65.9 HAIR LOSS: ICD-10-CM

## 2022-03-30 DIAGNOSIS — E03.8 HYPOTHYROIDISM DUE TO HASHIMOTO'S THYROIDITIS: Primary | ICD-10-CM

## 2022-03-30 DIAGNOSIS — E55.9 VITAMIN D DEFICIENCY: ICD-10-CM

## 2022-03-30 DIAGNOSIS — R63.5 WEIGHT GAIN: ICD-10-CM

## 2022-03-30 PROCEDURE — 99214 OFFICE O/P EST MOD 30 MIN: CPT | Performed by: INTERNAL MEDICINE

## 2022-03-30 NOTE — PATIENT INSTRUCTIONS
The thyroid blood work is good  Continue the same synthroid and liothyronine  The blood sugars and insulin levels are improved  Keep working on [employer] and exercise  You can take metformin if you wish, once daily for 2 weeks and then 2 daily  The ferritin is low showing low iron stores  Take iron 325 mg daily for the first month and then if constipated, drop down to 3 times a week  Make sure to take at least 3-4 hours away from the thyroid medicine  Follow up in 3 months with blood work

## 2022-06-09 DIAGNOSIS — E03.8 HYPOTHYROIDISM DUE TO HASHIMOTO'S THYROIDITIS: ICD-10-CM

## 2022-06-09 DIAGNOSIS — E06.3 HYPOTHYROIDISM DUE TO HASHIMOTO'S THYROIDITIS: ICD-10-CM

## 2022-06-09 RX ORDER — LIOTHYRONINE SODIUM 5 UG/1
TABLET ORAL
Qty: 90 TABLET | Refills: 3 | Status: SHIPPED | OUTPATIENT
Start: 2022-06-09

## 2022-06-09 RX ORDER — LEVOTHYROXINE SODIUM 100 MCG
TABLET ORAL
Qty: 90 TABLET | Refills: 3 | Status: SHIPPED | OUTPATIENT
Start: 2022-06-09

## 2023-02-09 ENCOUNTER — OFFICE VISIT (OUTPATIENT)
Dept: ENDOCRINOLOGY | Facility: HOSPITAL | Age: 48
End: 2023-02-09

## 2023-02-09 VITALS
SYSTOLIC BLOOD PRESSURE: 112 MMHG | DIASTOLIC BLOOD PRESSURE: 72 MMHG | OXYGEN SATURATION: 95 % | HEIGHT: 63 IN | WEIGHT: 203 LBS | HEART RATE: 104 BPM | BODY MASS INDEX: 35.97 KG/M2

## 2023-02-09 DIAGNOSIS — E03.8 HYPOTHYROIDISM DUE TO HASHIMOTO'S THYROIDITIS: Primary | ICD-10-CM

## 2023-02-09 DIAGNOSIS — E06.3 HYPOTHYROIDISM DUE TO HASHIMOTO'S THYROIDITIS: Primary | ICD-10-CM

## 2023-02-09 DIAGNOSIS — E78.5 HYPERLIPIDEMIA, UNSPECIFIED HYPERLIPIDEMIA TYPE: ICD-10-CM

## 2023-02-09 DIAGNOSIS — E88.81 INSULIN RESISTANCE: ICD-10-CM

## 2023-02-09 DIAGNOSIS — R53.83 OTHER FATIGUE: ICD-10-CM

## 2023-02-09 RX ORDER — ATORVASTATIN CALCIUM 10 MG/1
10 TABLET, FILM COATED ORAL DAILY
Qty: 90 TABLET | Refills: 1 | Status: SHIPPED | OUTPATIENT
Start: 2023-02-09

## 2023-02-09 NOTE — PROGRESS NOTES
Jamie Ponce 52 y o  female MRN: [id number]    Encounter: [id number]      Assessment/Plan     Assessment: This is a 52y o -year-old female with hypothyroidism due to Hashimoto's thyroiditis  Plan:  1  Hypothyroidism due to Hashimoto's thyroiditis: Recent thyroid lab work came back with an elevated TSH, however she did not have a free T4 and free T3 drawn  At this time I asked her to get lab work completed so we can see how to adjust her Synthroid and liothyronine  We will have her follow-up in 6 months with lab work completed prior to visit  2   Fatigue: Symptoms could be related to her thyroid, however with her other symptoms we will also check adrenal pituitary function for further evaluation  We will make further recommendations once lab results are in     3   Hyperlipidemia: Recommend starting atorvastatin 10 mg daily  CC: Hypothyroidism follow-up    History of Present Illness     HPI:  Jamie Ponce is a 52year old female with hypothyroidism due to Hashimoto's thyroiditis, vitamin-D deficiency, insulin resistance with weight gain for follow-up visit  Has not been seen since March 2022       She developed hypothyroidism due to Hashimoto's thyroiditis around 2011  She has been on thyroid hormone for replacement purposes ever since  She did at 1 point take Armor Thyroid but switched to a combination of Synthroid and liothyronine in the summer 2021  She has a history of thyroid nodules which were biopsied by ENT with inconclusive biopsy results but the nodules have since resolved on ultrasound        She takes Synthroid brand 100 mcg daily and liothyronine 5 mcg daily  Has not been feeling well recently and had lab work completed by her OB/GYN which did show an elevated TSH, but free T3 and free T4 were not drawn  She has been experiencing cold intolerance, fatigue, muscle aches and joint pain  Does feel she has had increase in her anxiety  Subjective weakness in extremities    She denies heat intolerance  She denies palpitation, tremors, or weight changes  She denies diarrhea or constipation  She has hair loss and breaking hair  She has dry skin and brittle nails  She denies diplopia  Menstrual cycles occur on a regular monthly basis but have been more heavy in flow recently  She denies compressive thyroid symptoms or difficulties with swallowing      She has insulin resistance and weight gain  Previously on metformin  Has been dieting exercise as best as able  Weight is stable  She denies polyuria, polydipsia, or polyphagia  She has nocturia due to drinking a lot of water  She denies blurry vision or extremity paresthesias      She has a history of vitamin-D deficiency and takes vitamin-D 8229-5889 units daily  Does admit she misses doses every once in a while  Recently cholesterol levels have been increasing  Does have a strong family history of hyperlipidemia  Is on fish oil, but has never been on any other cholesterol medications  Review of Systems   Constitutional: Positive for fatigue  Negative for activity change, appetite change and unexpected weight change  HENT: Negative for sore throat and trouble swallowing  Eyes: Negative for visual disturbance  Respiratory: Negative for chest tightness and shortness of breath  Cardiovascular: Negative for chest pain, palpitations and leg swelling  Gastrointestinal: Negative for abdominal pain, constipation, diarrhea, nausea and vomiting  Endocrine: Positive for cold intolerance  Negative for heat intolerance, polydipsia, polyphagia and polyuria  Genitourinary: Negative for frequency  Musculoskeletal: Positive for arthralgias and myalgias  Skin: Negative for wound  Brittle hair and hair loss   Neurological: Positive for headaches  Negative for dizziness, weakness and numbness  Psychiatric/Behavioral: Negative for dysphoric mood and sleep disturbance  The patient is nervous/anxious          Historical Information   Past Medical History:   Diagnosis Date   • Asthma     induced by exercise or smoke  • Deviated septum    • GERD (gastroesophageal reflux disease)    • Nasal polyps      Past Surgical History:   Procedure Laterality Date   •  SECTION      X 2     Social History   Social History     Substance and Sexual Activity   Alcohol Use Never     Social History     Substance and Sexual Activity   Drug Use Never     Social History     Tobacco Use   Smoking Status Never   Smokeless Tobacco Never     Family History:   Family History   Problem Relation Age of Onset   • Hypertension Mother    • Hashimoto's thyroiditis Mother    • Hypertension Father    • Sleep apnea Father    • Hypertension Brother    • Thyroid disease unspecified Brother    • Sleep apnea Brother    • Hyperlipidemia Brother    • Autism spectrum disorder Brother         suspected   • No Known Problems Son    • No Known Problems Daughter    • Diabetes type II Maternal Grandmother        Meds/Allergies   Current Outpatient Medications   Medication Sig Dispense Refill   • albuterol (PROVENTIL HFA,VENTOLIN HFA) 90 mcg/act inhaler      • Ascorbic Acid (Vitamin C) 500 MG CAPS Take by mouth daily With vitamin D 1000 units and zinc     • atorvastatin (LIPITOR) 10 mg tablet Take 1 tablet (10 mg total) by mouth daily 90 tablet 1   • budesonide-formoterol (SYMBICORT) 160-4 5 mcg/act inhaler Inhale 2 puffs 2 (two) times a day Rinse mouth after use       • Cholecalciferol (Vitamin D3) 50 MCG (2000 UT) TABS 8165-6178 units daily     • COLLAGEN PO Take by mouth Powder daily     • Lactobacillus (PROBIOTIC ACIDOPHILUS PO) Take by mouth daily     • liothyronine (CYTOMEL) 5 mcg tablet TAKE 1 TABLET BY MOUTH EVERY DAY 90 tablet 3   • losartan (COZAAR) 50 mg tablet Take 50 mg by mouth daily       • Magnesium 200 MG TABS Take by mouth daily     • mometasone (NASONEX) 50 mcg/act nasal spray every 24 hours     • Multiple Vitamins-Minerals (MULTIVITAMIN WOMEN PO) Take by mouth daily     • Omega-3 Fatty Acids (fish oil) 1,000 mg Take 1,000 mg by mouth daily     • Synthroid 100 MCG tablet TAKE 1 TABLET BY MOUTH EVERY DAY 90 tablet 3   • zinc gluconate 50 mg tablet Take 50 mg by mouth daily     • metFORMIN (GLUCOPHAGE-XR) 500 mg 24 hr tablet Take 1 tablet (500 mg total) by mouth 2 (two) times a day with meals (Patient not taking: Reported on 3/30/2022) 180 tablet 2   • omeprazole (PriLOSEC) 20 mg delayed release capsule Take 20 mg by mouth daily (Patient not taking: Reported on 2/9/2023)       No current facility-administered medications for this visit  Allergies   Allergen Reactions   • Sulfamethoxazole-Trimethoprim Hives       Objective   Vitals: Blood pressure 112/72, pulse 104, height 5' 2 5" (1 588 m), weight 92 1 kg (203 lb), SpO2 95 %  Physical Exam  Vitals and nursing note reviewed  Constitutional:       General: She is not in acute distress  Appearance: Normal appearance  She is not diaphoretic  HENT:      Head: Normocephalic and atraumatic  Eyes:      General: No scleral icterus  Extraocular Movements: Extraocular movements intact  Conjunctiva/sclera: Conjunctivae normal       Pupils: Pupils are equal, round, and reactive to light  Cardiovascular:      Rate and Rhythm: Normal rate and regular rhythm  Heart sounds: No murmur heard  Pulmonary:      Effort: Pulmonary effort is normal  No respiratory distress  Breath sounds: Normal breath sounds  No wheezing  Musculoskeletal:      Cervical back: Normal range of motion  Right lower leg: No edema  Left lower leg: No edema  Lymphadenopathy:      Cervical: No cervical adenopathy  Neurological:      Mental Status: She is alert and oriented to person, place, and time  Mental status is at baseline  Sensory: No sensory deficit  Gait: Gait normal    Psychiatric:         Mood and Affect: Mood normal          Behavior: Behavior normal          Thought Content:  Thought content normal          The history was obtained from the review of the chart, patient  Lab Results:   Lab Results   Component Value Date/Time    Free t4 1 07 03/23/2022 12:18 PM       Portions of the record may have been created with voice recognition software  Occasional wrong word or "sound a like" substitutions may have occurred due to the inherent limitations of voice recognition software  Read the chart carefully and recognize, using context, where substitutions have occurred

## 2023-02-09 NOTE — PATIENT INSTRUCTIONS
Continue with same dose of thyroid medication at this time  Get lab work completed at this time  Will call with results and make further recommendations  Follow up in 6 months

## 2023-03-08 LAB
ALDOST SERPL-MCNC: 2.5 NG/DL (ref 0–30)
ALDOST/RENIN PLAS-RTO: 2.2 {RATIO} (ref 0–30)
CORTIS AM PEAK SERPL-MCNC: 2 UG/DL (ref 6.2–19.4)
HBA1C MFR BLD: 5.4 % (ref 4.8–5.6)
IGF-I SERPL-MCNC: 147 NG/ML (ref 70–225)
PROLACTIN SERPL-MCNC: 15.6 NG/ML (ref 4.8–23.3)
RENIN PLAS-CCNC: 1.15 NG/ML/HR (ref 0.17–5.38)
T3FREE SERPL-MCNC: 2.7 PG/ML (ref 2–4.4)
T4 FREE SERPL-MCNC: 1.16 NG/DL (ref 0.82–1.77)
TSH SERPL DL<=0.005 MIU/L-ACNC: 1.35 UIU/ML (ref 0.45–4.5)

## 2023-03-09 DIAGNOSIS — R79.89 LOW SERUM CORTISOL LEVEL: Primary | ICD-10-CM

## 2023-03-10 DIAGNOSIS — R79.89 LOW SERUM CORTISOL LEVEL: ICD-10-CM

## 2023-03-20 ENCOUNTER — TELEPHONE (OUTPATIENT)
Dept: ENDOCRINOLOGY | Facility: HOSPITAL | Age: 48
End: 2023-03-20

## 2023-03-20 DIAGNOSIS — R79.89 LOW SERUM CORTISOL LEVEL: Primary | ICD-10-CM

## 2023-03-20 NOTE — TELEPHONE ENCOUNTER
I called the patient and left a message that the ACTH stim test was scheduled for 3/27/2023 at 830 am at the [address] [place] [address]  I did leave on the message how to get to the [facility] and that she will have an additional lab test that will need to be done prior to the stim test as well

## 2023-03-25 LAB — ACTH PLAS-MCNC: 20.1 PG/ML (ref 7.2–63.3)

## 2023-03-27 ENCOUNTER — HOSPITAL ENCOUNTER (OUTPATIENT)
Dept: INFUSION CENTER | Facility: HOSPITAL | Age: 48
Discharge: HOME | End: 2023-03-27
Attending: INTERNAL MEDICINE

## 2023-03-27 ENCOUNTER — TELEPHONE (OUTPATIENT)
Dept: ENDOCRINOLOGY | Facility: HOSPITAL | Age: 48
End: 2023-03-27

## 2023-03-27 VITALS
OXYGEN SATURATION: 98 % | TEMPERATURE: 98.2 F | SYSTOLIC BLOOD PRESSURE: 132 MMHG | DIASTOLIC BLOOD PRESSURE: 83 MMHG | HEART RATE: 81 BPM | RESPIRATION RATE: 16 BRPM

## 2023-03-27 DIAGNOSIS — R79.89 LOW SERUM CORTISOL LEVEL: Primary | ICD-10-CM

## 2023-03-27 LAB — CORTIS SERPL-MCNC: 30.4 UG/DL

## 2023-03-27 RX ADMIN — COSYNTROPIN 0.25 MG: 0.25 INJECTION, POWDER, LYOPHILIZED, FOR SOLUTION INTRAMUSCULAR; INTRAVENOUS at 10:01

## 2023-03-27 NOTE — PROGRESS NOTES
Pt tolerated cortisol stim test without difficulty  Green/yellow lab tubes drawn instead of the gold lab tubes  Lab notified and confirmed green/yellow tubes could not be accepted for this test  Dr Patricio Gonzalez office contacted and spoke with Cortes Carr who relayed the message to Dr Pili Christensen aware  Cortes Carr confirmed the test would need to be repeated on a different day  Pt made aware and will call infusion to re-schedule  Pt ambulated with a steady gait off unit   Refused AVS

## 2023-03-27 NOTE — TELEPHONE ENCOUNTER
Received call from [facility]  at infusion center  2 of the levels drawn with today's test, specimen's were collected in the wrong tube and the lab will not accept  Spoke with provider  Test will need to be re done  Infusion center was made aware and they will speak with the patient

## 2023-03-27 NOTE — TELEPHONE ENCOUNTER
I called Shubham Todd for her test results so she can go to the infusion center today  They are faxing the results over

## 2023-03-28 LAB — ACTH PLAS-MCNC: 16.2 PG/ML (ref 7.2–63.3)

## 2023-06-01 ENCOUNTER — TELEPHONE (OUTPATIENT)
Dept: ENDOCRINOLOGY | Facility: HOSPITAL | Age: 48
End: 2023-06-01

## 2023-06-01 DIAGNOSIS — E03.8 HYPOTHYROIDISM DUE TO HASHIMOTO'S THYROIDITIS: Primary | ICD-10-CM

## 2023-06-01 DIAGNOSIS — E06.3 HYPOTHYROIDISM DUE TO HASHIMOTO'S THYROIDITIS: Primary | ICD-10-CM

## 2023-06-01 NOTE — TELEPHONE ENCOUNTER
She will be going to the infusion center again for a test that you had ordered  She said she was told she needed to have baseline labs done first? She said the test needed copper tubing?

## 2023-06-01 NOTE — TELEPHONE ENCOUNTER
Patient has been shakey, and feeling heart flutters  She wants to know if you want to check her thyroid labs

## 2023-06-03 LAB
T3FREE SERPL-MCNC: 4.1 PG/ML (ref 2–4.4)
T4 FREE SERPL-MCNC: 1.15 NG/DL (ref 0.82–1.77)
TSH SERPL DL<=0.005 MIU/L-ACNC: 2.25 UIU/ML (ref 0.45–4.5)

## 2023-06-07 NOTE — TELEPHONE ENCOUNTER
Thyroid lab work came back normal  Is she already scheduled for an ACTH stim test or does she need that order also placed?

## 2023-06-09 DIAGNOSIS — R79.89 LOW SERUM CORTISOL LEVEL: Primary | ICD-10-CM

## 2023-08-13 DIAGNOSIS — E78.5 HYPERLIPIDEMIA, UNSPECIFIED HYPERLIPIDEMIA TYPE: ICD-10-CM

## 2023-08-14 RX ORDER — ATORVASTATIN CALCIUM 10 MG/1
10 TABLET, FILM COATED ORAL DAILY
Qty: 90 TABLET | Refills: 1 | Status: SHIPPED | OUTPATIENT
Start: 2023-08-14

## 2023-08-22 DIAGNOSIS — E03.8 HYPOTHYROIDISM DUE TO HASHIMOTO'S THYROIDITIS: ICD-10-CM

## 2023-08-22 DIAGNOSIS — E06.3 HYPOTHYROIDISM DUE TO HASHIMOTO'S THYROIDITIS: ICD-10-CM

## 2023-08-23 ENCOUNTER — TELEPHONE (OUTPATIENT)
Dept: ENDOCRINOLOGY | Facility: HOSPITAL | Age: 48
End: 2023-08-23

## 2023-08-23 RX ORDER — LIOTHYRONINE SODIUM 5 UG/1
TABLET ORAL
Qty: 90 TABLET | Refills: 3 | Status: SHIPPED | OUTPATIENT
Start: 2023-08-23

## 2023-08-23 RX ORDER — LEVOTHYROXINE SODIUM 100 MCG
TABLET ORAL
Qty: 90 TABLET | Refills: 3 | Status: SHIPPED | OUTPATIENT
Start: 2023-08-23

## 2023-08-23 NOTE — TELEPHONE ENCOUNTER
Received call from patient. She called to schedule the re do of the ACTH stim test and was told there needs to be new orders placed? Also patient is asking if lab work needs to be done before her upcoming appointment? Thank you.

## 2023-08-24 DIAGNOSIS — R79.89 LOW SERUM CORTISOL LEVEL: Primary | ICD-10-CM

## 2023-09-06 DIAGNOSIS — E78.5 HYPERLIPIDEMIA, UNSPECIFIED HYPERLIPIDEMIA TYPE: Primary | ICD-10-CM

## 2023-09-13 LAB
ACTH PLAS-MCNC: 37.2 PG/ML (ref 7.2–63.3)
CHOLEST SERPL-MCNC: 153 MG/DL (ref 100–199)
CORTIS SERPL-MCNC: 15.9 UG/DL (ref 6.2–19.4)
HDLC SERPL-MCNC: 31 MG/DL
LDLC SERPL CALC-MCNC: 82 MG/DL (ref 0–99)
SL AMB VLDL CHOLESTEROL CALC: 40 MG/DL (ref 5–40)
TRIGL SERPL-MCNC: 241 MG/DL (ref 0–149)

## 2023-09-15 ENCOUNTER — OFFICE VISIT (OUTPATIENT)
Dept: ENDOCRINOLOGY | Facility: HOSPITAL | Age: 48
End: 2023-09-15
Payer: COMMERCIAL

## 2023-09-15 VITALS
HEIGHT: 63 IN | DIASTOLIC BLOOD PRESSURE: 78 MMHG | BODY MASS INDEX: 37.74 KG/M2 | OXYGEN SATURATION: 96 % | WEIGHT: 213 LBS | HEART RATE: 89 BPM | SYSTOLIC BLOOD PRESSURE: 118 MMHG

## 2023-09-15 DIAGNOSIS — Z86.39 HISTORY OF THYROID NODULE: ICD-10-CM

## 2023-09-15 DIAGNOSIS — E03.8 HYPOTHYROIDISM DUE TO HASHIMOTO'S THYROIDITIS: Primary | ICD-10-CM

## 2023-09-15 DIAGNOSIS — R73.03 PREDIABETES: ICD-10-CM

## 2023-09-15 DIAGNOSIS — E06.3 HYPOTHYROIDISM DUE TO HASHIMOTO'S THYROIDITIS: Primary | ICD-10-CM

## 2023-09-15 PROCEDURE — 99214 OFFICE O/P EST MOD 30 MIN: CPT | Performed by: PHYSICIAN ASSISTANT

## 2023-09-15 RX ORDER — FLUTICASONE PROPIONATE AND SALMETEROL 50; 100 UG/1; UG/1
1 POWDER RESPIRATORY (INHALATION) EVERY 12 HOURS
COMMUNITY
Start: 2023-06-26

## 2023-09-15 RX ORDER — METFORMIN HYDROCHLORIDE 500 MG/1
500 TABLET, EXTENDED RELEASE ORAL
Qty: 90 TABLET | Refills: 1 | Status: SHIPPED | OUTPATIENT
Start: 2023-09-15

## 2023-09-15 NOTE — PROGRESS NOTES
Zack Gonsalez 52 y.o. female MRN: [id number]    Encounter: [id number]      Assessment/Plan     Assessment: This is a 52y.o.-year-old female with hypothyroidism due to Hashimoto's thyroiditis. Plan:  1. Hypothyroidism due to Hashimoto's thyroiditis: Last set of thyroid lab work was completed June 2023 and was within normal range. Ultimately I do not think her current symptoms are related to her thyroid, but I asked her to get repeat lab work at this time. She will continue with Synthroid 100 mcg daily and liothyronine 5 mcg daily. Contact the office if there is any concerns or questions. Follow-up in 6 months with lab work completed prior to visit.     2. Prediabetes: Is been noted to have prediabetes in the past.  Known to have insulin resistance. Has made lifestyle changes to help control glucose levels, but recently has been noticing a trend upwards. Also has 10 pound weight gain recently. Has never been on medications in the past.  At this time I think it be beneficial for her to start metformin  mg daily. Can slowly increase over time. Contact the office with any concerns or questions.     3. Hyperlipidemia: Significant improvement in lipid panel with starting atorvastatin. Continue with medication at this time. CC: Hypothyroidism follow-up    History of Present Illness     HPI:  Mary Alice Willoughby is a 52year old female with hypothyroidism due to Hashimoto's thyroiditis, vitamin-D deficiency, insulin resistance with weight gain for follow-up visit.   Has not been seen since March 2022.      She developed hypothyroidism due to Hashimoto's thyroiditis around 2011.  She has been on thyroid hormone for replacement purposes ever since. Adele Cornell did at 1 point take Armor Thyroid but switched to a combination of Synthroid and liothyronine in the summer 2021. Adele Cornell has a history of thyroid nodules which were biopsied by ENT with inconclusive biopsy results but the nodules have since resolved on ultrasound.       She takes Synthroid brand 100 mcg daily and liothyronine 5 mcg daily. She continues with fatigue at this time, but does also admit to increased stress or with health concerns and her children. She has been experiencing cold intolerance, fatigue, muscle aches and joint pain.   Does feel she has had increase in her anxiety. Subjective weakness in extremities. She denies heat intolerance. She denies palpitation, tremors, or weight changes.  She denies diarrhea or constipation.  She has hair loss and breaking hair.  She has dry skin and brittle nails. She denies diplopia.  Menstrual cycles occur on a regular monthly basis but have been more heavy in flow recently. Rosalio Ramsey denies compressive thyroid symptoms or difficulties with swallowing.     She has insulin resistance and weight gain. Has been dieting exercise as best as able. Has gained 10 pounds since last office visit. She denies polyuria, polydipsia, or polyphagia.  She has nocturia due to drinking a lot of water.  She denies blurry vision. She has been having some paresthesia in bilateral hands. This does come and go. Has been checking her glucose levels at varying times throughout the day. States she is normally between 100 and 150.     She has a history of vitamin-D deficiency and takes vitamin-D 0391-7985 units daily. Does admit she misses doses every once in a while.     Has been diagnosed with hyperlipidemia. Is currently on atorvastatin 10 mg daily. Has not had any side effects with medication. Denies any headaches, chest pain, MI or strokelike symptoms. Review of Systems   Constitutional: Positive for fatigue. Negative for activity change, appetite change and unexpected weight change. HENT: Negative for sore throat and trouble swallowing. Eyes: Negative for visual disturbance. Respiratory: Negative for chest tightness and shortness of breath. Cardiovascular: Negative for chest pain, palpitations and leg swelling. Gastrointestinal: Negative for abdominal pain, constipation, diarrhea, nausea and vomiting. Endocrine: Positive for cold intolerance. Negative for heat intolerance, polydipsia, polyphagia and polyuria. Genitourinary: Negative for frequency. Musculoskeletal: Positive for arthralgias and myalgias. Skin: Negative for wound. Brittle hair and hair loss   Neurological: Positive for headaches. Negative for dizziness, weakness and numbness. Paresthesia in bilateral hands   Psychiatric/Behavioral: Negative for dysphoric mood and sleep disturbance. The patient is nervous/anxious. Historical Information   Past Medical History:   Diagnosis Date   • Asthma     induced by exercise or smoke.    • Deviated septum    • GERD (gastroesophageal reflux disease)    • Nasal polyps      Past Surgical History:   Procedure Laterality Date   •  SECTION      X 2     Social History   Social History     Substance and Sexual Activity   Alcohol Use Never     Social History     Substance and Sexual Activity   Drug Use Never     Social History     Tobacco Use   Smoking Status Never   Smokeless Tobacco Never     Family History:   Family History   Problem Relation Age of Onset   • Hypertension Mother    • Hashimoto's thyroiditis Mother    • Hypertension Father    • Sleep apnea Father    • Hypertension Brother    • Thyroid disease unspecified Brother    • Sleep apnea Brother    • Hyperlipidemia Brother    • Autism spectrum disorder Brother         suspected   • No Known Problems Son    • No Known Problems Daughter    • Diabetes type II Maternal Grandmother        Meds/Allergies   Current Outpatient Medications   Medication Sig Dispense Refill   • Advair Diskus 100-50 MCG/ACT inhaler Inhale 1 puff every 12 (twelve) hours     • albuterol (PROVENTIL HFA,VENTOLIN HFA) 90 mcg/act inhaler      • Ascorbic Acid (Vitamin C) 500 MG CAPS Take by mouth daily With vitamin D 1000 units and zinc     • atorvastatin (LIPITOR) 10 mg tablet TAKE 1 TABLET BY MOUTH EVERY DAY 90 tablet 1   • Cholecalciferol (Vitamin D3) 50 MCG (2000 UT) TABS 8720-7387 units daily     • COLLAGEN PO Take by mouth Powder daily     • Lactobacillus (PROBIOTIC ACIDOPHILUS PO) Take by mouth daily     • liothyronine (CYTOMEL) 5 mcg tablet TAKE 1 TABLET BY MOUTH EVERY DAY 90 tablet 3   • losartan (COZAAR) 50 mg tablet Take 50 mg by mouth daily       • Magnesium 200 MG TABS Take by mouth daily     • metFORMIN (GLUCOPHAGE-XR) 500 mg 24 hr tablet Take 1 tablet (500 mg total) by mouth daily with breakfast 90 tablet 1   • mometasone (NASONEX) 50 mcg/act nasal spray every 24 hours     • Multiple Vitamins-Minerals (MULTIVITAMIN WOMEN PO) Take by mouth daily     • Omega-3 Fatty Acids (fish oil) 1,000 mg Take 1,000 mg by mouth daily     • Synthroid 100 MCG tablet TAKE 1 TABLET BY MOUTH EVERY DAY 90 tablet 3   • zinc gluconate 50 mg tablet Take 50 mg by mouth daily     • budesonide-formoterol (SYMBICORT) 160-4.5 mcg/act inhaler Inhale 2 puffs 2 (two) times a day Rinse mouth after use. (Patient not taking: Reported on 9/15/2023)     • omeprazole (PriLOSEC) 20 mg delayed release capsule Take 20 mg by mouth daily (Patient not taking: Reported on 2/9/2023)       No current facility-administered medications for this visit. Allergies   Allergen Reactions   • Sulfamethoxazole-Trimethoprim Hives       Objective   Vitals: Blood pressure 118/78, pulse 89, height 5' 2.5" (1.588 m), weight 96.6 kg (213 lb), SpO2 96 %. Physical Exam  Vitals and nursing note reviewed. Constitutional:       General: She is not in acute distress. Appearance: Normal appearance. She is not diaphoretic. HENT:      Head: Normocephalic and atraumatic. Eyes:      General: No scleral icterus. Extraocular Movements: Extraocular movements intact. Conjunctiva/sclera: Conjunctivae normal.      Pupils: Pupils are equal, round, and reactive to light.    Cardiovascular:      Rate and Rhythm: Normal rate and regular rhythm. Heart sounds: No murmur heard. Pulmonary:      Effort: Pulmonary effort is normal. No respiratory distress. Breath sounds: Normal breath sounds. No wheezing. Musculoskeletal:      Cervical back: Normal range of motion. Right lower leg: No edema. Left lower leg: No edema. Lymphadenopathy:      Cervical: No cervical adenopathy. Neurological:      Mental Status: She is alert and oriented to person, place, and time. Mental status is at baseline. Sensory: No sensory deficit. Gait: Gait normal.   Psychiatric:         Mood and Affect: Mood normal.         Behavior: Behavior normal.         Thought Content: Thought content normal.         The history was obtained from the review of the chart, patient. Lab Results:   Lab Results   Component Value Date/Time    Free t4 1.15 06/02/2023 12:54 PM    Free t4 1.16 02/27/2023 07:57 AM       Portions of the record may have been created with voice recognition software. Occasional wrong word or "sound a like" substitutions may have occurred due to the inherent limitations of voice recognition software. Read the chart carefully and recognize, using context, where substitutions have occurred.

## 2023-09-15 NOTE — PATIENT INSTRUCTIONS
Continue with same dose of thyroid medication at this time. Start metformin 500 mg daily. Get lab work completed at this time. Will call with results and make further recommendations. Follow up in 6 months.

## 2023-09-20 LAB
ALBUMIN SERPL-MCNC: 4.3 G/DL (ref 3.9–4.9)
ALBUMIN/GLOB SERPL: 1.7 {RATIO} (ref 1.2–2.2)
ALP SERPL-CCNC: 68 IU/L (ref 44–121)
ALT SERPL-CCNC: 21 IU/L (ref 0–32)
AST SERPL-CCNC: 24 IU/L (ref 0–40)
BILIRUB SERPL-MCNC: 0.7 MG/DL (ref 0–1.2)
BUN SERPL-MCNC: 12 MG/DL (ref 6–24)
BUN/CREAT SERPL: 15 (ref 9–23)
CALCIUM SERPL-MCNC: 9.3 MG/DL (ref 8.7–10.2)
CHLORIDE SERPL-SCNC: 106 MMOL/L (ref 96–106)
CO2 SERPL-SCNC: 20 MMOL/L (ref 20–29)
CREAT SERPL-MCNC: 0.8 MG/DL (ref 0.57–1)
EGFR: 91 ML/MIN/1.73
GLOBULIN SER-MCNC: 2.5 G/DL (ref 1.5–4.5)
GLUCOSE SERPL-MCNC: 90 MG/DL (ref 70–99)
HBA1C MFR BLD: 5.6 % (ref 4.8–5.6)
INSULIN SERPL-ACNC: 28 UIU/ML (ref 2.6–24.9)
POTASSIUM SERPL-SCNC: 4.1 MMOL/L (ref 3.5–5.2)
PROT SERPL-MCNC: 6.8 G/DL (ref 6–8.5)
SODIUM SERPL-SCNC: 140 MMOL/L (ref 134–144)
T3FREE SERPL-MCNC: 2.8 PG/ML (ref 2–4.4)
T4 FREE SERPL-MCNC: 0.76 NG/DL (ref 0.82–1.77)
TSH SERPL DL<=0.005 MIU/L-ACNC: 2.68 UIU/ML (ref 0.45–4.5)

## 2023-09-21 DIAGNOSIS — E06.3 HYPOTHYROIDISM DUE TO HASHIMOTO'S THYROIDITIS: Primary | ICD-10-CM

## 2023-09-21 DIAGNOSIS — E03.8 HYPOTHYROIDISM DUE TO HASHIMOTO'S THYROIDITIS: Primary | ICD-10-CM

## 2023-10-20 ENCOUNTER — APPOINTMENT (EMERGENCY)
Dept: CT IMAGING | Facility: HOSPITAL | Age: 48
End: 2023-10-20
Payer: COMMERCIAL

## 2023-10-20 ENCOUNTER — HOSPITAL ENCOUNTER (EMERGENCY)
Facility: HOSPITAL | Age: 48
Discharge: HOME | End: 2023-10-20
Attending: EMERGENCY MEDICINE
Payer: COMMERCIAL

## 2023-10-20 ENCOUNTER — TELEPHONE (OUTPATIENT)
Dept: NEUROSURGERY | Facility: CLINIC | Age: 48
End: 2023-10-20

## 2023-10-20 VITALS
RESPIRATION RATE: 16 BRPM | DIASTOLIC BLOOD PRESSURE: 91 MMHG | TEMPERATURE: 98.4 F | OXYGEN SATURATION: 93 % | SYSTOLIC BLOOD PRESSURE: 138 MMHG | HEART RATE: 87 BPM

## 2023-10-20 DIAGNOSIS — H02.402 PTOSIS OF EYELID, LEFT: Primary | ICD-10-CM

## 2023-10-20 LAB
ALBUMIN SERPL BCP-MCNC: 4.4 G/DL (ref 3.5–5)
ALP SERPL-CCNC: 59 U/L (ref 34–104)
ALT SERPL W P-5'-P-CCNC: 18 U/L (ref 7–52)
ANION GAP SERPL CALCULATED.3IONS-SCNC: 7 MMOL/L
AST SERPL W P-5'-P-CCNC: 19 U/L (ref 13–39)
B BURGDOR IGG+IGM SER QL IA: NEGATIVE
BASOPHILS # BLD AUTO: 0.09 THOUSANDS/ÂΜL (ref 0–0.1)
BASOPHILS NFR BLD AUTO: 1 % (ref 0–1)
BILIRUB SERPL-MCNC: 0.7 MG/DL (ref 0.2–1)
BUN SERPL-MCNC: 21 MG/DL (ref 5–25)
CALCIUM SERPL-MCNC: 9.3 MG/DL (ref 8.4–10.2)
CHLORIDE SERPL-SCNC: 106 MMOL/L (ref 96–108)
CO2 SERPL-SCNC: 25 MMOL/L (ref 21–32)
CREAT SERPL-MCNC: 0.78 MG/DL (ref 0.6–1.3)
EOSINOPHIL # BLD AUTO: 0.2 THOUSAND/ÂΜL (ref 0–0.61)
EOSINOPHIL NFR BLD AUTO: 2 % (ref 0–6)
ERYTHROCYTE [DISTWIDTH] IN BLOOD BY AUTOMATED COUNT: 12.4 % (ref 11.6–15.1)
EXT PREGNANCY TEST URINE: NEGATIVE
EXT. CONTROL: NORMAL
GFR SERPL CREATININE-BSD FRML MDRD: 90 ML/MIN/1.73SQ M
GLUCOSE SERPL-MCNC: 86 MG/DL (ref 65–140)
HCT VFR BLD AUTO: 37.7 % (ref 34.8–46.1)
HGB BLD-MCNC: 12.6 G/DL (ref 11.5–15.4)
IMM GRANULOCYTES # BLD AUTO: 0.05 THOUSAND/UL (ref 0–0.2)
IMM GRANULOCYTES NFR BLD AUTO: 1 % (ref 0–2)
LYMPHOCYTES # BLD AUTO: 4.32 THOUSANDS/ÂΜL (ref 0.6–4.47)
LYMPHOCYTES NFR BLD AUTO: 40 % (ref 14–44)
MCH RBC QN AUTO: 28.7 PG (ref 26.8–34.3)
MCHC RBC AUTO-ENTMCNC: 33.4 G/DL (ref 31.4–37.4)
MCV RBC AUTO: 86 FL (ref 82–98)
MONOCYTES # BLD AUTO: 1.01 THOUSAND/ÂΜL (ref 0.17–1.22)
MONOCYTES NFR BLD AUTO: 9 % (ref 4–12)
NEUTROPHILS # BLD AUTO: 5.24 THOUSANDS/ÂΜL (ref 1.85–7.62)
NEUTS SEG NFR BLD AUTO: 47 % (ref 43–75)
NRBC BLD AUTO-RTO: 0 /100 WBCS
PLATELET # BLD AUTO: 383 THOUSANDS/UL (ref 149–390)
PMV BLD AUTO: 9.7 FL (ref 8.9–12.7)
POTASSIUM SERPL-SCNC: 3.8 MMOL/L (ref 3.5–5.3)
PROT SERPL-MCNC: 7.3 G/DL (ref 6.4–8.4)
RBC # BLD AUTO: 4.39 MILLION/UL (ref 3.81–5.12)
SODIUM SERPL-SCNC: 138 MMOL/L (ref 135–147)
WBC # BLD AUTO: 10.91 THOUSAND/UL (ref 4.31–10.16)

## 2023-10-20 PROCEDURE — 99284 EMERGENCY DEPT VISIT MOD MDM: CPT | Performed by: PHYSICIAN ASSISTANT

## 2023-10-20 PROCEDURE — G1004 CDSM NDSC: HCPCS

## 2023-10-20 PROCEDURE — 99284 EMERGENCY DEPT VISIT MOD MDM: CPT

## 2023-10-20 PROCEDURE — 81025 URINE PREGNANCY TEST: CPT | Performed by: PHYSICIAN ASSISTANT

## 2023-10-20 PROCEDURE — 86618 LYME DISEASE ANTIBODY: CPT | Performed by: PHYSICIAN ASSISTANT

## 2023-10-20 PROCEDURE — 85025 COMPLETE CBC W/AUTO DIFF WBC: CPT | Performed by: PHYSICIAN ASSISTANT

## 2023-10-20 PROCEDURE — 70496 CT ANGIOGRAPHY HEAD: CPT

## 2023-10-20 PROCEDURE — 80053 COMPREHEN METABOLIC PANEL: CPT | Performed by: PHYSICIAN ASSISTANT

## 2023-10-20 PROCEDURE — 36415 COLL VENOUS BLD VENIPUNCTURE: CPT | Performed by: PHYSICIAN ASSISTANT

## 2023-10-20 PROCEDURE — 70498 CT ANGIOGRAPHY NECK: CPT

## 2023-10-20 PROCEDURE — 96360 HYDRATION IV INFUSION INIT: CPT

## 2023-10-20 RX ADMIN — IOHEXOL 85 ML: 350 INJECTION, SOLUTION INTRAVENOUS at 12:09

## 2023-10-20 RX ADMIN — SODIUM CHLORIDE 500 ML: 0.9 INJECTION, SOLUTION INTRAVENOUS at 10:57

## 2023-10-20 NOTE — CONSULTS
e-Consult ([facility])     Consults     Contacted by Dejah Sibley @ [facility]. Yoni Castaneda 52 y.o. female MRN: [id number]  Unit/Bed#: ED 02 Encounter: [id number]    Reason for Consult    Per provider report, patient with PMH significant for HTN and HLD presents with ptosis OS with 1 hour of associated visual disturbance last night. She was initially at an OSH but the ED waiting time was too long so she left as she was then asymptomatic. She presented to the ED today for evaluation. Available past medical history,social history, surgical history, medication list, drug allergies and review of systems were reviewed. /99 (BP Location: Left arm)   Pulse 78   Temp 98.4 °F (36.9 °C)   Resp 16   SpO2 98%      Clinical exam per provider report, GCS 15 with nonfocal neuro exam.    Imaging personally reviewed. CTA head and neck w/wo, 10/20/23: 1 mm posteriorly projecting outpouching off the supraclinoid right ICA, which could represent posterior communicating artery infundibulum versus aneurysm     Assessment and Recommendations    1. Continue to monitor neurological exam  2. Given small size of intact abnormality of the PCA, no transfer is recommended at this time  3. Patient can follow up in 1-2 months in our office to review imaging    All questions answered. Provider is in agreement with the course of action. 5-10 minutes, >50% of the total time devoted to medical consultative verbal/EMR discussion between providers. Written report will be generated in the EMR.

## 2023-10-20 NOTE — TELEPHONE ENCOUNTER
10/20/2023- [address]  12/01/2023 APT NO IMAGING      Dessiree Latricia Fulton PA-C   Please schedule HFU with [facility] for aneurysm evaluation in 1-2 months, no imaging

## 2023-10-20 NOTE — DISCHARGE INSTRUCTIONS
Follow up with [facility] Neurosurgical mid level - [address],9[address] - 1-2 months for recheck.      Follow up with your primary care provider early next week for recheck    Return to ED for new or worsening symptoms

## 2023-10-20 NOTE — ED PROVIDER NOTES
History  Chief Complaint   Patient presents with    Blurred Vision     Pt said yesterday she had an episode of blurry vision yesterday, seen at [facility] and left. Pt denies blurred vision at this time. Pt states she has a mild headache. Patient is a 80-year-old white female with history of hypertension, hyperlipidemia, GERD who reports she has been under extreme stress over the past week regarding her 22-year-old son who is dealing with anxiety and panic attacks. Patient states she was in [facility] pharmacy  last night trying  to arrange therapy follow-up for him at 8:45 PM when she noticed her left upper eyelid drooping. States the eyelid "would go up and down intermittently". States she felt like her "vision was not right" from the left eye. Denies facial droop. States she has felt rundown and fatigued over the last approximately 1 week. Lost her voice 4 days ago. Pt was seen at the urgent care 3 days ago and prescribed prednisone and Z-Maynor. Reports she is not feeling much better. She has had a vague headache but behind her eyes for the past week. Patient denies any speech or balance disturbance. She denies any new headache. She denies any extremity weakness numbness or tingling. Denies chest pain or shortness of breath. She went to [facility] last night. She was evaluated in triage and briefly by a doctor, then  sent back to the waiting room where she waited for 4 hours prior to leaving without further evaluation. States her symptoms resolved completely by 10 pm        Prior to Admission Medications   Prescriptions Last Dose Informant Patient Reported? Taking?    Advair Diskus 100-50 MCG/ACT inhaler 10/20/2023 Self Yes Yes   Sig: Inhale 1 puff every 12 (twelve) hours   Ascorbic Acid (Vitamin C) 500 MG CAPS 10/20/2023 Self Yes Yes   Sig: Take by mouth daily With vitamin D 1000 units and zinc   COLLAGEN PO 10/20/2023 Self Yes Yes   Sig: Take by mouth Powder daily   Cholecalciferol (Vitamin D3) 50 MCG ( UT) TABS 10/20/2023 Self No Yes   [id number] units daily   Lactobacillus (PROBIOTIC ACIDOPHILUS PO) 10/20/2023 Self Yes Yes   Sig: Take by mouth daily   Magnesium 200 MG TABS 10/20/2023 Self Yes Yes   Sig: Take by mouth daily   Multiple Vitamins-Minerals (MULTIVITAMIN WOMEN PO) 10/20/2023 Self Yes Yes   Sig: Take by mouth daily   Omega-3 Fatty Acids (fish oil) 1,000 mg 10/20/2023 Self Yes Yes   Sig: Take 1,000 mg by mouth daily   Synthroid 100 MCG tablet 10/20/2023 Self No Yes   Sig: TAKE 1 TABLET BY MOUTH EVERY DAY   albuterol (PROVENTIL HFA,VENTOLIN HFA) 90 mcg/act inhaler 10/20/2023 Self Yes Yes   atorvastatin (LIPITOR) 10 mg tablet 10/20/2023 Self No Yes   Sig: TAKE 1 TABLET BY MOUTH EVERY DAY   budesonide-formoterol (SYMBICORT) 160-4.5 mcg/act inhaler  Self Yes No   Sig: Inhale 2 puffs 2 (two) times a day Rinse mouth after use. Patient not taking: Reported on 9/15/2023   liothyronine (CYTOMEL) 5 mcg tablet 10/20/2023 Self No Yes   Sig: TAKE 1 TABLET BY MOUTH EVERY DAY   losartan (COZAAR) 50 mg tablet 10/20/2023 Self Yes Yes   Sig: Take 50 mg by mouth daily     metFORMIN (GLUCOPHAGE-XR) 500 mg 24 hr tablet 10/20/2023  No Yes   Sig: Take 1 tablet (500 mg total) by mouth daily with breakfast   mometasone (NASONEX) 50 mcg/act nasal spray 10/20/2023 Self Yes Yes   Sig: every 24 hours   omeprazole (PriLOSEC) 20 mg delayed release capsule Unknown Self Yes No   Sig: Take 20 mg by mouth daily   Patient not taking: Reported on 2023   zinc gluconate 50 mg tablet 10/20/2023 Self Yes Yes   Sig: Take 50 mg by mouth daily      Facility-Administered Medications: None       Past Medical History:   Diagnosis Date    Asthma     induced by exercise or smoke.     Deviated septum     GERD (gastroesophageal reflux disease)     Nasal polyps        Past Surgical History:   Procedure Laterality Date     SECTION      X 2       Family History   Problem Relation Age of Onset    Hypertension Mother Hashimoto's thyroiditis Mother     Hypertension Father     Sleep apnea Father     Hypertension Brother     Thyroid disease unspecified Brother     Sleep apnea Brother     Hyperlipidemia Brother     Autism spectrum disorder Brother         suspected    No Known Problems Son     No Known Problems Daughter     Diabetes type II Maternal Grandmother      I have reviewed and agree with the history as documented. E-Cigarette/Vaping    E-Cigarette Use Never User      E-Cigarette/Vaping Substances     Social History     Tobacco Use    Smoking status: Never    Smokeless tobacco: Never   Vaping Use    Vaping Use: Never used   Substance Use Topics    Alcohol use: Never    Drug use: Never       Review of Systems   Constitutional:  Negative for chills and fever. HENT:  Negative for ear pain and sore throat. Eyes:  Positive for visual disturbance. Negative for photophobia and pain. Respiratory:  Negative for cough and shortness of breath. Cardiovascular:  Negative for chest pain and palpitations. Gastrointestinal:  Negative for abdominal pain, nausea and vomiting. Genitourinary:  Negative for dysuria and hematuria. Musculoskeletal:  Negative for arthralgias, back pain and neck pain. Skin:  Negative for color change and rash. Neurological:  Positive for headaches. Negative for dizziness, seizures, syncope, facial asymmetry and numbness. All other systems reviewed and are negative. Physical Exam  Physical Exam  Vitals and nursing note reviewed. Constitutional:       General: She is not in acute distress. Appearance: Normal appearance. She is not ill-appearing, toxic-appearing or diaphoretic. HENT:      Head: Normocephalic and atraumatic. Right Ear: Tympanic membrane, ear canal and external ear normal.      Left Ear: Tympanic membrane, ear canal and external ear normal.      Nose: Nose normal.      Mouth/Throat:      Mouth: Mucous membranes are moist.      Pharynx: Oropharynx is clear. Eyes:      Extraocular Movements: Extraocular movements intact. Conjunctiva/sclera: Conjunctivae normal.      Pupils: Pupils are equal, round, and reactive to light. Cardiovascular:      Rate and Rhythm: Normal rate and regular rhythm. Pulses: Normal pulses. Heart sounds: Normal heart sounds. Pulmonary:      Effort: Pulmonary effort is normal.      Breath sounds: Normal breath sounds. Abdominal:      General: Abdomen is flat. Bowel sounds are normal.      Palpations: Abdomen is soft. Musculoskeletal:         General: Normal range of motion. Cervical back: Normal range of motion and neck supple. Skin:     General: Skin is warm and dry. Capillary Refill: Capillary refill takes less than 2 seconds. Neurological:      General: No focal deficit present. Mental Status: She is alert and oriented to person, place, and time. Mental status is at baseline. Cranial Nerves: No cranial nerve deficit. Sensory: No sensory deficit. Motor: No weakness. Coordination: Coordination normal.      Gait: Gait normal.      Deep Tendon Reflexes: Reflexes normal.         Vital Signs  ED Triage Vitals [10/20/23 0952]   Temperature Pulse Respirations Blood Pressure SpO2   98.4 °F (36.9 °C) 98 18 155/95 98 %      Temp src Heart Rate Source Patient Position - Orthostatic VS BP Location FiO2 (%)   -- -- -- -- --      Pain Score       --           Vitals:    10/20/23 0952   BP: 155/95   Pulse: 98         Visual Acuity      ED Medications  Medications - No data to display    Diagnostic Studies  Results Reviewed       None                   No orders to display              Procedures  Procedures         ED Course                                             Medical Decision Making  Labs reviewed. Mild leukocytosis noted. CTA head findings of 1 mm posterior protrusion oiutpouching off supraclinoid portion of R ICA possibly representing post comm artery infundibulum vs aneurysm d/w patient. I tiger texted neurosurgical mid level on call [facility] who advised that patient can follow up with neurosurgical PA Joanna Lea in 1-2 months for recheck. No neurological symptoms throughout ED course. Doubt TIA at this time. Recommended against aspirin at this time until incidental R ICA finding further pursued in neurosurgical foillow up. Pt advised to follow up with her PCP- [facility]- Monday for recheck. Strict return precautions given     Amount and/or Complexity of Data Reviewed  Labs: ordered. Radiology: ordered. Risk  Prescription drug management. Disposition  Final diagnoses:   None     ED Disposition       None          Follow-up Information    None         Patient's Medications   Discharge Prescriptions    No medications on file       No discharge procedures on file.     PDMP Review       None            ED Provider  Electronically Signed by             Sendy Levine PA-C  10/20/23 1688

## 2023-10-31 LAB
LEFT EYE DIABETIC RETINOPATHY: NORMAL
RIGHT EYE DIABETIC RETINOPATHY: NORMAL

## 2023-11-30 PROBLEM — I67.1 INTRACRANIAL ANEURYSM: Status: ACTIVE | Noted: 2023-11-30

## 2023-11-30 NOTE — ASSESSMENT & PLAN NOTE
Seen for evaluation of a possible supraclinoid ICA aneurysm vs infundibulum  Noted on work up for left eye ptosis 10/2023. Episode was transient, lasting 1-2 hrs and has not recurred. During that time has blurry and double vision. Reportedly normal ophthalmologic exam. Has not seen Neurology yet. No family history of aneurysm or sudden death. Non-smoker. BP controlled. Exam: non-focal     Imaging:  CTA head/neck 10/20/23: CT head: No acute intracranial abnormalities. Polypoid mucosal thickening and mucous retention cyst within bilateral maxillary sinuses. Nonspecific prominent soft tissue thickening/enhancement within the right nasal canthal fold. Correlate with physical exam for cellulitis versus underlying soft tissue mass. No high-grade stenosis or large vessel occlusion. 1 mm posteriorly projecting outpouching off the supraclinoid right ICA, which could represent posterior communicating artery infundibulum versus aneurysm. Recommend consultation with [facility], a division of [address] [place] [address] for Neuroscience at [phone]. No high-grade stenosis, dissection or aneurysm involving the carotid or vertebral arteries. Plan:  Reviewed images with patient. Tiny aneurysm vs infundibulum noted in the right supraclinoid ICA. Discussed natural history of aneurysms and risk of rupture and infundibulum which is an anatomic variant. Discussed that this is not the cause of her left eye symptoms. Discussed genetic component to aneurysms as well as modifiable risk factors such as smoking and HTN. If this is a true aneurysm, given current size and location, risk of rupture is <1%/year per UCAS and <1%/5yrs per ISIUA. Reviewed red flag s/s. Follow up in 4 months (6 months from last study) with [facility] head as joint appointment with endovascular attending to hopefully better discern aneurysm from infundibulum. Call sooner with any questions or concerns.

## 2023-12-01 ENCOUNTER — OFFICE VISIT (OUTPATIENT)
Dept: NEUROSURGERY | Facility: CLINIC | Age: 48
End: 2023-12-01
Payer: COMMERCIAL

## 2023-12-01 VITALS
DIASTOLIC BLOOD PRESSURE: 84 MMHG | OXYGEN SATURATION: 97 % | HEART RATE: 84 BPM | BODY MASS INDEX: 37.73 KG/M2 | TEMPERATURE: 98.2 F | WEIGHT: 205 LBS | HEIGHT: 62 IN | SYSTOLIC BLOOD PRESSURE: 127 MMHG

## 2023-12-01 DIAGNOSIS — I67.1 INTRACRANIAL ANEURYSM: Primary | ICD-10-CM

## 2023-12-01 PROCEDURE — 99203 OFFICE O/P NEW LOW 30 MIN: CPT | Performed by: PHYSICIAN ASSISTANT

## 2023-12-01 RX ORDER — PREDNISONE 10 MG/1
10 TABLET ORAL
COMMUNITY
Start: 2023-11-22

## 2023-12-01 NOTE — PROGRESS NOTES
Neurosurgery Office Note  Den Benavides 52 y.o. female MRN: [id number]      Assessment/Plan     Intracranial aneurysm  Seen for evaluation of a possible supraclinoid ICA aneurysm vs infundibulum  Noted on work up for left eye ptosis 10/2023. Episode was transient, lasting 1-2 hrs and has not recurred. During that time has blurry and double vision. Reportedly normal ophthalmologic exam. Has not seen Neurology yet. No family history of aneurysm or sudden death. Non-smoker. BP controlled. Exam: non-focal     Imaging:  CTA head/neck 10/20/23: CT head: No acute intracranial abnormalities. Polypoid mucosal thickening and mucous retention cyst within bilateral maxillary sinuses. Nonspecific prominent soft tissue thickening/enhancement within the right nasal canthal fold. Correlate with physical exam for cellulitis versus underlying soft tissue mass. No high-grade stenosis or large vessel occlusion. 1 mm posteriorly projecting outpouching off the supraclinoid right ICA, which could represent posterior communicating artery infundibulum versus aneurysm. Recommend [facility] with [facility], a division of [address] [place] [address] for Neuroscience at [phone]. No high-grade stenosis, dissection or aneurysm involving the carotid or vertebral arteries. Plan:  Reviewed images with patient. Tiny aneurysm vs infundibulum noted in the right supraclinoid ICA. Discussed natural history of aneurysms and risk of rupture and infundibulum which is an anatomic variant. Discussed that this is not the cause of her left eye symptoms. Discussed genetic component to aneurysms as well as modifiable risk factors such as smoking and HTN. If this is a true aneurysm, given current size and location, risk of rupture is <1%/year per UCAS and <1%/5yrs per ISIUA. Reviewed red flag s/s.   Follow up in 4 months (6 months from last study) with MRA head as joint appointment with endovascular attending to hopefully better discern aneurysm from infundibulum. Call sooner with any questions or concerns. Diagnoses and all orders for this visit:    Intracranial aneurysm  -     MRA head wo contrast; Future    Other orders  -     predniSONE 10 mg tablet; Take 10 mg by mouth          I have spent a total time of 35 minutes on 12/01/23 in caring for this patient including Diagnostic results, Instructions for management, Patient and family education, Risk factor reductions, Impressions, Counseling / Coordination of care, Documenting in the medical record, Reviewing / ordering tests, medicine, procedures  , and Obtaining or reviewing history  . CHIEF COMPLAINT    Chief Complaint   Patient presents with    Follow-up     Hosp FU aneurysm        HISTORY    History of Present Illness     52y.o. year old female     52year old female seen for evaluation of a possible right supraclinoid ICA aneurysm vs infundibulum. This was incidentally noted on CTA done for transient left eye ptosis. States that in mid October 2023 she was ill on prednisone and was dealing with some stress. While sitting in her car she felt that the left eyelid was drooped and her vision was blurry. Was unable to lift her eyelid completely. Felt that she was seeing double. Only affected the left eye. This lasted for about 1-2 hrs then spontaneously resolved while in the ED. She left and came back the next day and a CTA was done. This has not recurred. She has not seen Neurology yet. No family history of aneurysm/sudden death, non-smoker, BP controlled. Saw Ophthalmology with a reportedly normal exam, but recommended evaluation for possible MG. Denies HA, vision issues, numbness/weakness in her extremities. Noting some dizziness when she lays down. See Discussion    REVIEW OF SYSTEMS    Review of Systems   Constitutional:  Positive for fatigue. HENT: Negative. Eyes: Negative. Respiratory: Negative. Asthmatic   Cardiovascular: Negative. Gastrointestinal: Negative. Endocrine: Negative. Genitourinary: Negative. Musculoskeletal: Negative. Skin: Negative. Allergic/Immunologic: Negative. Neurological:  Positive for dizziness. Hematological: Negative. Psychiatric/Behavioral: Negative. ROS obtained by MA. Reviewed. See HPI. Meds/Allergies     Current Outpatient Medications   Medication Sig Dispense Refill    Advair Diskus 100-50 MCG/ACT inhaler Inhale 1 puff every 12 (twelve) hours      albuterol (PROVENTIL HFA,VENTOLIN HFA) 90 mcg/act inhaler       Ascorbic Acid (Vitamin C) 500 MG CAPS Take by mouth daily With vitamin D 1000 units and zinc      atorvastatin (LIPITOR) 10 mg tablet TAKE 1 TABLET BY MOUTH EVERY DAY 90 tablet 1    Cholecalciferol (Vitamin D3) 50 MCG (2000 UT) TABS 7196-7729 units daily      COLLAGEN PO Take by mouth Powder daily      Lactobacillus (PROBIOTIC ACIDOPHILUS PO) Take by mouth daily      liothyronine (CYTOMEL) 5 mcg tablet TAKE 1 TABLET BY MOUTH EVERY DAY 90 tablet 3    losartan (COZAAR) 50 mg tablet Take 50 mg by mouth daily        Magnesium 200 MG TABS Take by mouth daily      metFORMIN (GLUCOPHAGE-XR) 500 mg 24 hr tablet Take 1 tablet (500 mg total) by mouth daily with breakfast 90 tablet 1    mometasone (NASONEX) 50 mcg/act nasal spray every 24 hours      Multiple Vitamins-Minerals (MULTIVITAMIN WOMEN PO) Take by mouth daily      Omega-3 Fatty Acids (fish oil) 1,000 mg Take 1,000 mg by mouth daily      predniSONE 10 mg tablet Take 10 mg by mouth      Synthroid 100 MCG tablet TAKE 1 TABLET BY MOUTH EVERY DAY 90 tablet 3    zinc gluconate 50 mg tablet Take 50 mg by mouth daily      budesonide-formoterol (SYMBICORT) 160-4.5 mcg/act inhaler Inhale 2 puffs 2 (two) times a day Rinse mouth after use.  (Patient not taking: Reported on 9/15/2023)      omeprazole (PriLOSEC) 20 mg delayed release capsule Take 20 mg by mouth daily (Patient not taking: Reported on 2/9/2023)       No current facility-administered medications for this visit. Allergies   Allergen Reactions    Sulfamethoxazole-Trimethoprim Hives       PAST HISTORY    Past Medical History:   Diagnosis Date    Asthma     induced by exercise or smoke. Deviated septum     GERD (gastroesophageal reflux disease)     Intracranial aneurysm 2023    Nasal polyps        Past Surgical History:   Procedure Laterality Date     SECTION      X 2       Social History     Tobacco Use    Smoking status: Never    Smokeless tobacco: Never   Vaping Use    Vaping Use: Never used   Substance Use Topics    Alcohol use: Never    Drug use: Never       Family History   Problem Relation Age of Onset    Hypertension Mother     Hashimoto's thyroiditis Mother     Hypertension Father     Sleep apnea Father     Hypertension Brother     Thyroid disease unspecified Brother     Sleep apnea Brother     Hyperlipidemia Brother     Autism spectrum disorder Brother         suspected    No Known Problems Son     No Known Problems Daughter     Diabetes type II Maternal Grandmother          Above history personally reviewed. EXAM    Vitals:Blood pressure 127/84, pulse 84, temperature 98.2 °F (36.8 °C), temperature source Temporal, height 5' 2" (1.575 m), weight 93 kg (205 lb), SpO2 97 %. ,Body mass index is 37.49 kg/m². Physical Exam  Vitals reviewed. Constitutional:       General: She is awake. Appearance: Normal appearance. HENT:      Head: Normocephalic and atraumatic. Eyes:      Extraocular Movements: EOM normal.      Conjunctiva/sclera: Conjunctivae normal.      Pupils: Pupils are equal, round, and reactive to light. Cardiovascular:      Rate and Rhythm: Normal rate. Pulmonary:      Effort: Pulmonary effort is normal.   Skin:     General: Skin is warm and dry. Neurological:      Mental Status: She is alert and oriented to person, place, and time.       Motor: Motor strength is normal.     Coordination: Finger-Nose-Finger Test and Heel to [place] Test normal.      Gait: Gait is intact. Deep Tendon Reflexes:      Reflex Scores:       Bicep reflexes are 2+ on the right side and 2+ on the left side. Brachioradialis reflexes are 2+ on the right side and 2+ on the left side. Patellar reflexes are 2+ on the right side and 2+ on the left side. Achilles reflexes are 2+ on the right side and 2+ on the left side. Psychiatric:         Attention and Perception: Attention and perception normal.         Mood and Affect: Mood and affect normal.         Speech: Speech normal.         Behavior: Behavior normal. Behavior is cooperative. Thought Content: Thought content normal.         Cognition and Memory: Cognition and memory normal.         Judgment: Judgment normal.         Neurologic Exam     Mental Status   Oriented to person, place, and time. Follows 2 step commands. Attention: normal. Concentration: normal.   Speech: speech is normal   Level of consciousness: alert  Knowledge: good. Able to perform simple calculations. Able to name object. Able to repeat. Normal comprehension. Cranial Nerves     CN II   Visual fields full to confrontation. Right visual field deficit: none  Left visual field deficit: none     CN III, IV, VI   Pupils are equal, round, and reactive to light. Extraocular motions are normal.   Right pupil: Shape: regular. Reactivity: brisk. Consensual response: intact. Left pupil: Shape: regular. Reactivity: brisk. Consensual response: intact. CN III: no CN III palsy  CN VI: no CN VI palsy  Nystagmus: none   Ophthalmoparesis: none  Upgaze: normal  Downgaze: normal  Conjugate gaze: present    CN V   Facial sensation intact. CN VII   Facial expression full, symmetric.      CN VIII   Hearing: intact    CN XI   Right trapezius strength: normal  Left trapezius strength: normal    CN XII   CN XII normal.     Motor Exam   Muscle bulk: normal  Overall muscle tone: normal  Right arm pronator drift: absent  Left arm pronator drift: absent    Strength   Strength 5/5 throughout. Sensory Exam   Light touch normal.     Gait, Coordination, and Reflexes     Gait  Gait: normal    Coordination   Finger to nose coordination: normal  Heel to shin coordination: normal    Tremor   Resting tremor: absent  Intention tremor: absent  Action tremor: absent    Reflexes   Right brachioradialis: 2+  Left brachioradialis: 2+  Right biceps: 2+  Left biceps: 2+  Right patellar: 2+  Left patellar: 2+  Right achilles: 2+  Left achilles: 2+  Right Perales: absent  Left Perales: absent  Right ankle clonus: absent  Left ankle clonus: absent        MEDICAL DECISION MAKING    Imaging Studies:     No results found. I have personally reviewed pertinent reports.    and I have personally reviewed pertinent films in PACS

## 2024-03-11 ENCOUNTER — TELEPHONE (OUTPATIENT)
Dept: ENDOCRINOLOGY | Facility: HOSPITAL | Age: 49
End: 2024-03-11

## 2024-03-11 DIAGNOSIS — E55.9 VITAMIN D DEFICIENCY: ICD-10-CM

## 2024-03-11 DIAGNOSIS — R73.03 PREDIABETES: ICD-10-CM

## 2024-03-11 DIAGNOSIS — E78.5 HYPERLIPIDEMIA, UNSPECIFIED HYPERLIPIDEMIA TYPE: ICD-10-CM

## 2024-03-11 DIAGNOSIS — E06.3 HYPOTHYROIDISM DUE TO HASHIMOTO'S THYROIDITIS: Primary | ICD-10-CM

## 2024-03-11 DIAGNOSIS — E03.8 HYPOTHYROIDISM DUE TO HASHIMOTO'S THYROIDITIS: Primary | ICD-10-CM

## 2024-03-11 NOTE — TELEPHONE ENCOUNTER
The patient left a message. She would like to know if you could order lab work to check her A1c, lipid, thyroid levels, and other general labs

## 2024-03-11 NOTE — TELEPHONE ENCOUNTER
There were already lab ordered in the system for her upcoming appointment, however it seems like they were buried somewhere in the chart so I did discontinue those and reprinted new labs.

## 2024-03-13 NOTE — TELEPHONE ENCOUNTER
Patient is at [facility] now, but they don't have the lab slip.  Faxed it to the [facility] in [place].

## 2024-03-14 LAB
25(OH)D3 SERPL-MCNC: 37 NG/ML (ref 30–100)
ALBUMIN SERPL-MCNC: 4.5 G/DL (ref 3.6–5.1)
ALBUMIN/GLOB SERPL: 1.7 (CALC) (ref 1–2.5)
ALP SERPL-CCNC: 68 U/L (ref 31–125)
ALT SERPL-CCNC: 16 U/L (ref 6–29)
AST SERPL-CCNC: 19 U/L (ref 10–35)
BILIRUB SERPL-MCNC: 1.2 MG/DL (ref 0.2–1.2)
BUN SERPL-MCNC: 16 MG/DL (ref 7–25)
BUN/CREAT SERPL: NORMAL (CALC) (ref 6–22)
CALCIUM SERPL-MCNC: 9.5 MG/DL (ref 8.6–10.2)
CHLORIDE SERPL-SCNC: 107 MMOL/L (ref 98–110)
CHOLEST SERPL-MCNC: 144 MG/DL
CHOLEST/HDLC SERPL: 4.8 (CALC)
CO2 SERPL-SCNC: 24 MMOL/L (ref 20–32)
CREAT SERPL-MCNC: 0.71 MG/DL (ref 0.5–0.99)
GFR/BSA.PRED SERPLBLD CYS-BASED-ARV: 105 ML/MIN/1.73M2
GLOBULIN SER CALC-MCNC: 2.7 G/DL (CALC) (ref 1.9–3.7)
GLUCOSE SERPL-MCNC: 96 MG/DL (ref 65–99)
HBA1C MFR BLD: 5.1 % OF TOTAL HGB
HDLC SERPL-MCNC: 30 MG/DL
LDLC SERPL CALC-MCNC: 84 MG/DL (CALC)
NONHDLC SERPL-MCNC: 114 MG/DL (CALC)
POTASSIUM SERPL-SCNC: 4 MMOL/L (ref 3.5–5.3)
PROT SERPL-MCNC: 7.2 G/DL (ref 6.1–8.1)
SODIUM SERPL-SCNC: 141 MMOL/L (ref 135–146)
T3FREE SERPL-MCNC: 4 PG/ML (ref 2.3–4.2)
T4 FREE SERPL-MCNC: 1.3 NG/DL (ref 0.8–1.8)
TRIGL SERPL-MCNC: 199 MG/DL
TSH SERPL-ACNC: 0.13 MIU/L

## 2024-03-15 ENCOUNTER — OFFICE VISIT (OUTPATIENT)
Dept: ENDOCRINOLOGY | Facility: HOSPITAL | Age: 49
End: 2024-03-15
Payer: COMMERCIAL

## 2024-03-15 VITALS
OXYGEN SATURATION: 96 % | HEIGHT: 62 IN | SYSTOLIC BLOOD PRESSURE: 104 MMHG | WEIGHT: 200 LBS | HEART RATE: 91 BPM | DIASTOLIC BLOOD PRESSURE: 78 MMHG | BODY MASS INDEX: 36.8 KG/M2

## 2024-03-15 DIAGNOSIS — E03.8 HYPOTHYROIDISM DUE TO HASHIMOTO'S THYROIDITIS: Primary | ICD-10-CM

## 2024-03-15 DIAGNOSIS — R79.0 LOW FERRITIN: ICD-10-CM

## 2024-03-15 DIAGNOSIS — E55.9 VITAMIN D DEFICIENCY: ICD-10-CM

## 2024-03-15 DIAGNOSIS — E78.5 HYPERLIPIDEMIA, UNSPECIFIED HYPERLIPIDEMIA TYPE: ICD-10-CM

## 2024-03-15 DIAGNOSIS — E06.3 HYPOTHYROIDISM DUE TO HASHIMOTO'S THYROIDITIS: Primary | ICD-10-CM

## 2024-03-15 DIAGNOSIS — R73.03 PREDIABETES: ICD-10-CM

## 2024-03-15 PROCEDURE — 99214 OFFICE O/P EST MOD 30 MIN: CPT | Performed by: PHYSICIAN ASSISTANT

## 2024-03-15 NOTE — PROGRESS NOTES
Mary Alice Willoughby 48 y.o. female MRN: [id number]    Encounter: [id number]      Assessment/Plan     Assessment:  This is a 48 y.o.-year-old female with hypothyroidism due to Hashimoto's thyroiditis.    Plan:  1.  Hypothyroidism due to Hashimoto's thyroiditis: Most recent free T3 and free T4 were normal range with TSH being slightly low.  Thyroid seems to be improving, and this could be due to her current weight loss.  At this time since she is feeling well, we will continue with Synthroid 100 mcg 1 tablet 5 days a week and 2 tablets on Sunday.  She will also continue with liothyronine 5 mcg daily.  We did discuss symptoms of hyperthyroidism.  If there is any concerns, please contact the office.  Follow-up in 6 months with lab work completed prior to visit.     2.  Prediabetes: Started on metformin and has not had any side effects.  A1c has significantly improved and is now 5.1.  At this time I recommend continue with metformin  mg daily.  Continue with lifestyle modifications also to help improve glucose levels.  Contact the office with any concerns or questions.  Repeat A1c and CMP prior to next office visit.     3.  Hyperlipidemia: Significant improvement in lipid panel with starting atorvastatin.  Triglycerides are still slightly elevated, but continue lifestyle modifications may help out.  Continue with medication at this time.  Repeat lipid panel prior to next office visit.    4.  Iron deficiency: No recent iron panel or CBC has been completed.  Is currently not taking any iron supplement.  Does have heavy menses and is concerned about anemia or at least iron deficiency.  Will obtain lab work at this time and make further recommendations.    CC: Hypothyroidism follow-up    History of Present Illness     HPI:  Mary Alice Willoughby is a 48 year old female with hypothyroidism due to Hashimoto's thyroiditis, vitamin-D deficiency, insulin resistance with weight gain for follow-up visit.  Has not been seen since  March 2022.      She developed hypothyroidism due to Hashimoto's thyroiditis around 2011.  She has been on thyroid hormone for replacement purposes ever since.  She did at 1 point take Armor Thyroid but switched to a combination of Synthroid and liothyronine in the summer 2021.  She has a history of thyroid nodules which were biopsied by ENT with inconclusive biopsy results but the nodules have since resolved on ultrasound.       She takes Synthroid brand 100 mcg 6 days a week and 2 tablets on Sunday and liothyronine 5 mcg daily.  Continues to have fatigue at this time, was going to be evaluated by sleep medicine for sleep apnea. She has been experiencing cold intolerance, fatigue, muscle aches and joint pain.  Subjective weakness in extremities.  She denies heat intolerance. She denies palpitation, tremors, or weight changes.  She denies diarrhea or constipation.  Has had increased hair growth recently.  She has dry skin and brittle nails. She denies diplopia.  Menstrual cycles occur on a regular monthly basis but have been more heavy in flow recently.  Is following up with OB/GYN, and is concerned for iron deficiency at this time.  She denies compressive thyroid symptoms or difficulties with swallowing.     She has insulin resistance and weight gain.  Has been dieting exercise as best as able.  Diabetes range in the past.  Was started on metformin  mg daily summer 2023.  Has not had any side effects with medication.  Has gained 15 pounds since last office visit. She denies polyuria, polydipsia, or polyphagia.  She has nocturia due to drinking a lot of water.  She denies blurry vision.  She has been having some paresthesia in bilateral hands.  This does come and go.  And finds self snacking less frequently.  She is also increased her physical activity, typically walking every night.     She has a history of vitamin-D deficiency and takes vitamin-D 9890-7442 units daily.  Does admit she misses doses every once  in a while.     Has been diagnosed with hyperlipidemia.  Is currently on atorvastatin 10 mg daily.  Has not had any side effects with medication.  Denies any headaches, chest pain, MI or strokelike symptoms.    Review of Systems   Constitutional:  Positive for fatigue. Negative for activity change, appetite change and unexpected weight change.   HENT:  Negative for sore throat and trouble swallowing.    Eyes:  Negative for visual disturbance.   Respiratory:  Negative for chest tightness and shortness of breath.    Cardiovascular:  Negative for chest pain, palpitations and leg swelling.   Gastrointestinal:  Negative for abdominal pain, constipation, diarrhea, nausea and vomiting.   Endocrine: Positive for cold intolerance. Negative for heat intolerance, polydipsia, polyphagia and polyuria.   Genitourinary:  Negative for frequency.   Musculoskeletal:  Positive for arthralgias and myalgias.   Skin:  Negative for wound.        Brittle hair and hair loss   Neurological:  Positive for headaches. Negative for dizziness, weakness and numbness.        Paresthesia in bilateral hands   Psychiatric/Behavioral:  Negative for dysphoric mood and sleep disturbance. The patient is not nervous/anxious.        Historical Information   Past Medical History:   Diagnosis Date   • Asthma     induced by exercise or smoke.   • Deviated septum    • GERD (gastroesophageal reflux disease)    • Intracranial aneurysm 2023   • Nasal polyps      Past Surgical History:   Procedure Laterality Date   •  SECTION      X 2     Social History   Social History     Substance and Sexual Activity   Alcohol Use Never     Social History     Substance and Sexual Activity   Drug Use Never     Social History     Tobacco Use   Smoking Status Never   Smokeless Tobacco Never     Family History:   Family History   Problem Relation Age of Onset   • Hypertension Mother    • Hashimoto's thyroiditis Mother    • Hypertension Father    • Sleep apnea Father     • Hypertension Brother    • Thyroid disease unspecified Brother    • Sleep apnea Brother    • Hyperlipidemia Brother    • Autism spectrum disorder Brother         suspected   • No Known Problems Son    • No Known Problems Daughter    • Diabetes type II Maternal Grandmother        Meds/Allergies   Current Outpatient Medications   Medication Sig Dispense Refill   • Advair Diskus 100-50 MCG/ACT inhaler Inhale 1 puff every 12 (twelve) hours     • albuterol (PROVENTIL HFA,VENTOLIN HFA) 90 mcg/act inhaler      • Ascorbic Acid (Vitamin C) 500 MG CAPS Take by mouth daily With vitamin D 1000 units and zinc     • atorvastatin (LIPITOR) 10 mg tablet TAKE 1 TABLET BY MOUTH EVERY DAY 90 tablet 1   • Cholecalciferol (Vitamin D3) 50 MCG (2000 UT) TABS 0026-8132 units daily     • COLLAGEN PO Take by mouth Powder daily     • Lactobacillus (PROBIOTIC ACIDOPHILUS PO) Take by mouth daily     • liothyronine (CYTOMEL) 5 mcg tablet TAKE 1 TABLET BY MOUTH EVERY DAY 90 tablet 3   • losartan (COZAAR) 50 mg tablet Take 50 mg by mouth daily       • Magnesium 200 MG TABS Take by mouth daily     • metFORMIN (GLUCOPHAGE-XR) 500 mg 24 hr tablet Take 1 tablet (500 mg total) by mouth daily with breakfast 90 tablet 1   • mometasone (NASONEX) 50 mcg/act nasal spray every 24 hours     • Multiple Vitamins-Minerals (MULTIVITAMIN WOMEN PO) Take by mouth daily     • Omega-3 Fatty Acids (fish oil) 1,000 mg Take 1,000 mg by mouth daily     • Synthroid 100 MCG tablet TAKE 1 TABLET BY MOUTH EVERY DAY (Patient taking differently: Take 1 tablet Monday-Saturday and 2 tablets on Sunday) 90 tablet 3   • zinc gluconate 50 mg tablet Take 50 mg by mouth daily     • budesonide-formoterol (SYMBICORT) 160-4.5 mcg/act inhaler Inhale 2 puffs 2 (two) times a day Rinse mouth after use. (Patient not taking: Reported on 9/15/2023)     • omeprazole (PriLOSEC) 20 mg delayed release capsule Take 20 mg by mouth daily (Patient not taking: Reported on 2/9/2023)     • predniSONE 10  "mg tablet Take 10 mg by mouth (Patient not taking: Reported on 3/15/2024)       No current facility-administered medications for this visit.     Allergies   Allergen Reactions   • Sulfamethoxazole-Trimethoprim Hives       Objective   Vitals: Blood pressure 104/78, pulse 91, height 5' 2\" (1.575 m), weight 90.7 kg (200 lb), SpO2 96%.    Physical Exam  Vitals and nursing note reviewed.   Constitutional:       General: She is not in acute distress.     Appearance: Normal appearance. She is not diaphoretic.   HENT:      Head: Normocephalic and atraumatic.   Eyes:      General: No scleral icterus.     Extraocular Movements: Extraocular movements intact.      Conjunctiva/sclera: Conjunctivae normal.      Pupils: Pupils are equal, round, and reactive to light.   Cardiovascular:      Rate and Rhythm: Normal rate and regular rhythm.      Heart sounds: No murmur heard.  Pulmonary:      Effort: Pulmonary effort is normal. No respiratory distress.      Breath sounds: Normal breath sounds. No wheezing.   Musculoskeletal:         General: No swelling.      Cervical back: Normal range of motion.   Lymphadenopathy:      Cervical: No cervical adenopathy.   Neurological:      Mental Status: She is alert and oriented to person, place, and time. Mental status is at baseline.      Sensory: No sensory deficit.      Gait: Gait normal.   Psychiatric:         Mood and Affect: Mood normal.         Behavior: Behavior normal.         Thought Content: Thought content normal.         The history was obtained from the review of the chart, patient.    Lab Results:   Lab Results   Component Value Date/Time    Free t4 1.3 03/13/2024 11:03 AM    Free t4 0.76 (L) 09/19/2023 12:45 PM    Free t4 1.15 06/02/2023 12:54 PM     Component      Latest Ref Rng 3/13/2024   Hemoglobin A1C      <5.7 % of total Hgb 5.1      Component      Latest Ref Rng 3/13/2024   GLUCOSE      65 - 99 mg/dL 96    BUN      7 - 25 mg/dL 16    Creatinine      0.50 - 0.99 mg/dL 0.71  " "  SL AMB BUN/CREATININE RATIO      6 - 22 (calc) SEE NOTE:    Sodium      135 - 146 mmol/L 141    Potassium      3.5 - 5.3 mmol/L 4.0    Chloride      98 - 110 mmol/L 107    Carbon Dioxide      20 - 32 mmol/L 24    Total Protein      6.1 - 8.1 g/dL 7.2    Albumin      3.6 - 5.1 g/dL 4.5    Albumin/Globulin Ratio      1.0 - 2.5 (calc) 1.7    Total Bilirubin      0.2 - 1.2 mg/dL 1.2    AST      10 - 35 U/L 19    ALT      6 - 29 U/L 16    GFR, Calculated      > OR = 60 mL/min/1.73m2 105    Calcium      8.6 - 10.2 mg/dL 9.5    ALK PHOS      31 - 125 U/L 68    Globulin      1.9 - 3.7 g/dL (calc) 2.7        Portions of the record may have been created with voice recognition software. Occasional wrong word or \"sound a like\" substitutions may have occurred due to the inherent limitations of voice recognition software. Read the chart carefully and recognize, using context, where substitutions have occurred.    "

## 2024-03-15 NOTE — PATIENT INSTRUCTIONS
Continue with same dose of thyroid medication at this time.     Continue metformin 500 mg daily.     Get lab work completed at this time.     Will call with results and make further recommendations.    Get iron lab work completed.     Follow up in 6 months.

## 2024-03-23 DIAGNOSIS — R73.03 PREDIABETES: ICD-10-CM

## 2024-03-25 RX ORDER — METFORMIN HYDROCHLORIDE 500 MG/1
500 TABLET, EXTENDED RELEASE ORAL
Qty: 90 TABLET | Refills: 1 | Status: SHIPPED | OUTPATIENT
Start: 2024-03-25

## 2024-04-22 ENCOUNTER — HOSPITAL ENCOUNTER (OUTPATIENT)
Dept: MRI IMAGING | Facility: HOSPITAL | Age: 49
Discharge: HOME | End: 2024-04-22
Payer: COMMERCIAL

## 2024-04-22 ENCOUNTER — TELEPHONE (OUTPATIENT)
Dept: NEUROSURGERY | Facility: CLINIC | Age: 49
End: 2024-04-22

## 2024-04-22 DIAGNOSIS — I67.1 INTRACRANIAL ANEURYSM: ICD-10-CM

## 2024-04-22 PROCEDURE — 70544 MR ANGIOGRAPHY HEAD W/O DYE: CPT

## 2024-04-22 PROCEDURE — G1004 CDSM NDSC: HCPCS

## 2024-04-25 NOTE — ASSESSMENT & PLAN NOTE
6 month follow up of a possible supraclinoid ICA aneurysm vs infundibulum  Noted on work up for left eye ptosis 10/2023. Episode was transient, lasting 1-2 hrs and has not recurred. During that time has blurry and double vision.   Reportedly normal ophthalmologic exam. Has not seen Neurology.   No family history of aneurysm or sudden death. Non-smoker. BP controlled.   Exam limited via video: Able to name month, perform simple calculation, name object. EOMI. No dysarthria.  Face is symmetric, tongue midline.  JACOB x 4    Imaging:  MRA head 4/22/24: Unchanged tiny focal outpouching in expected origin of right posterior communicating artery, may represent infundibulum or aneurysm.     Plan:  Reviewed images with patient. Tiny aneurysm vs infundibulum noted in the right supraclinoid ICA.  Discussed natural history of aneurysms and risk of rupture and infundibulum which is an anatomic variant with no risk of rupture.  Discussed that this is not the cause of her previous left eye symptoms. Discussed possible neurology evaluation.   Discussed genetic component to aneurysms as well as modifiable risk factors such as smoking and HTN.  If this is a true aneurysm, given current size and location, risk of rupture is <1%/year per UCAS and <1%/5yrs per ISIUA.  Reviewed red flag s/s.  Follow up in 1 year with MRA head as joint appointment with Dr. Mccracken.  Call sooner with any questions or concerns.

## 2024-04-26 ENCOUNTER — TELEMEDICINE (OUTPATIENT)
Dept: NEUROSURGERY | Facility: CLINIC | Age: 49
End: 2024-04-26
Payer: COMMERCIAL

## 2024-04-26 VITALS — HEIGHT: 62 IN | BODY MASS INDEX: 36.44 KG/M2 | WEIGHT: 198 LBS

## 2024-04-26 DIAGNOSIS — I67.1 INTRACRANIAL ANEURYSM: Primary | ICD-10-CM

## 2024-04-26 PROCEDURE — 99213 OFFICE O/P EST LOW 20 MIN: CPT | Performed by: PHYSICIAN ASSISTANT

## 2024-04-26 NOTE — PROGRESS NOTES
Virtual Regular Visit    Verification of patient location:    Patient is located at Home in the following state in which I hold an active license [place]      Assessment/Plan:    Problem List Items Addressed This Visit       Intracranial aneurysm - Primary     6 month follow up of a possible supraclinoid ICA aneurysm vs infundibulum  Noted on work up for left eye ptosis 10/2023. Episode was transient, lasting 1-2 hrs and has not recurred. During that time has blurry and double vision.   Reportedly normal ophthalmologic exam. Has not seen Neurology.   No family history of aneurysm or sudden death. Non-smoker. BP controlled.   Exam limited via video: Able to name month, perform simple calculation, name object. EOMI. No dysarthria.  Face is symmetric, tongue midline.  JACOB x 4    Imaging:  MRA head 4/22/24: Unchanged tiny focal outpouching in expected origin of right posterior communicating artery, may represent infundibulum or aneurysm.     Plan:  Reviewed images with patient. Tiny aneurysm vs infundibulum noted in the right supraclinoid ICA.  Discussed natural history of aneurysms and risk of rupture and infundibulum which is an anatomic variant with no risk of rupture.  Discussed that this is not the cause of her previous left eye symptoms. Discussed possible neurology evaluation.   Discussed genetic component to aneurysms as well as modifiable risk factors such as smoking and HTN.  If this is a true aneurysm, given current size and location, risk of rupture is <1%/year per UCAS and <1%/5yrs per ISIUA.  Reviewed red flag s/s.  Follow up in 1 year with MRA head as joint appointment with Dr. Mccracken.  Call sooner with any questions or concerns.         Relevant Orders    MRA head wo contrast            Reason for visit is   Chief Complaint   Patient presents with    Virtual Regular Visit          Encounter provider Mae Eller PA-C      Recent Visits  Date Type Provider Dept   04/22/24 Telephone Felipa Collazo [facility]   04/22/24 Telephone Felipa Collazo [facility]   Showing recent visits within past 7 days and meeting all other requirements  Today's Visits  Date Type Provider Dept   04/26/24 Telemedicine Mae Eller PA-C [facility]   Showing today's visits and meeting all other requirements  Future Appointments  No visits were found meeting these conditions.  Showing future appointments within next 150 days and meeting all other requirements       The patient was identified by name and date of birth. Mary Alice Willoughby was informed that this is a telemedicine visit and that the visit is being conducted through the Epic Embedded platform. She agrees to proceed..  My office door was closed. No one else was in the room.  She acknowledged consent and understanding of privacy and security of the video platform. The patient has agreed to participate and understands they can discontinue the visit at any time.    Patient is aware this is a billable service.     Subjective  Mary Alice Willoughby is a 48 y.o. female .      48 year old female seen for 6 month follow up a possible right supraclinoid ICA aneurysm vs infundibulum. This was incidentally noted on CTA done for transient left eye ptosis. States that in mid October 2023 she was ill on prednisone and was dealing with some stress. While sitting in her car she felt that the left eyelid was drooped and her vision was blurry. Was unable to lift her eyelid completely. Felt that she was seeing double. Only affected the left eye. This lasted for about 1-2 hrs then spontaneously resolved while in the ED. She left and came back the next day and a CTA was done. This has not recurred. She has not seen Neurology. No family history of aneurysm/sudden death, non-smoker, BP controlled. Saw Ophthalmology with a reportedly normal exam, but recommended evaluation for possible MG. Denies HA, vision issues, numbness/weakness in her  extremities.         Past Medical History:   Diagnosis Date    Asthma     induced by exercise or smoke.    Deviated septum     GERD (gastroesophageal reflux disease)     Intracranial aneurysm 2023    Nasal polyps        Past Surgical History:   Procedure Laterality Date     SECTION      X 2       Current Outpatient Medications   Medication Sig Dispense Refill    Advair Diskus 100-50 MCG/ACT inhaler Inhale 1 puff every 12 (twelve) hours      albuterol (PROVENTIL HFA,VENTOLIN HFA) 90 mcg/act inhaler       Ascorbic Acid (Vitamin C) 500 MG CAPS Take by mouth daily With vitamin D 1000 units and zinc      atorvastatin (LIPITOR) 10 mg tablet TAKE 1 TABLET BY MOUTH EVERY DAY 90 tablet 1    Cholecalciferol (Vitamin D3) 50 MCG (2000 UT) TABS 3574-4250 units daily      COLLAGEN PO Take by mouth Powder daily      Lactobacillus (PROBIOTIC ACIDOPHILUS PO) Take by mouth daily      liothyronine (CYTOMEL) 5 mcg tablet TAKE 1 TABLET BY MOUTH EVERY DAY 90 tablet 3    losartan (COZAAR) 50 mg tablet Take 50 mg by mouth daily        Magnesium 200 MG TABS Take by mouth daily      metFORMIN (GLUCOPHAGE-XR) 500 mg 24 hr tablet TAKE 1 TABLET BY MOUTH EVERY DAY WITH BREAKFAST 90 tablet 1    mometasone (NASONEX) 50 mcg/act nasal spray every 24 hours      Multiple Vitamins-Minerals (MULTIVITAMIN WOMEN PO) Take by mouth daily      Omega-3 Fatty Acids (fish oil) 1,000 mg Take 1,000 mg by mouth daily      omeprazole (PriLOSEC) 20 mg delayed release capsule Take 20 mg by mouth daily      Synthroid 100 MCG tablet TAKE 1 TABLET BY MOUTH EVERY DAY (Patient taking differently: Take 1 tablet Monday-Saturday and 2 tablets on ) 90 tablet 3    zinc gluconate 50 mg tablet Take 50 mg by mouth daily      budesonide-formoterol (SYMBICORT) 160-4.5 mcg/act inhaler Inhale 2 puffs 2 (two) times a day Rinse mouth after use. (Patient not taking: Reported on 9/15/2023)      predniSONE 10 mg tablet Take 10 mg by mouth (Patient not taking: Reported  "on 4/26/2024)       No current facility-administered medications for this visit.        Allergies   Allergen Reactions    Sulfamethoxazole-Trimethoprim Hives       Review of Systems   Constitutional:  Positive for fatigue.   HENT: Negative.     Eyes: Negative.    Respiratory: Negative.          Asthmatic   Cardiovascular: Negative.    Gastrointestinal: Negative.    Endocrine: Negative.    Genitourinary: Negative.    Musculoskeletal: Negative.    Skin: Negative.    Allergic/Immunologic: Negative.    Neurological: Negative.  Negative for dizziness.   Hematological: Negative.    Psychiatric/Behavioral: Negative.         Video Exam    Vitals:    04/26/24 1031   Weight: 89.8 kg (198 lb)   Height: 5' 2\" (1.575 m)       Physical Exam  Constitutional:       Appearance: Normal appearance.   Pulmonary:      Effort: Pulmonary effort is normal.   Skin:     General: Skin is warm and dry.   Neurological:      Mental Status: She is alert.      GCS: GCS eye subscore is 4. GCS verbal subscore is 5. GCS motor subscore is 6.      Comments:   Able to name month, perform simple calculation, name object. EOMI. No dysarthria.  Face is symmetric, tongue midline.  JACOB x 4   Psychiatric:         Mood and Affect: Mood normal.         Behavior: Behavior normal.         Thought Content: Thought content normal.         Judgment: Judgment normal.          Visit Time  Total Visit Duration: 20 minutes        " 18

## 2024-05-12 DIAGNOSIS — E78.5 HYPERLIPIDEMIA, UNSPECIFIED HYPERLIPIDEMIA TYPE: ICD-10-CM

## 2024-05-13 RX ORDER — ATORVASTATIN CALCIUM 10 MG/1
10 TABLET, FILM COATED ORAL DAILY
Qty: 90 TABLET | Refills: 1 | Status: SHIPPED | OUTPATIENT
Start: 2024-05-13

## 2024-08-29 DIAGNOSIS — E78.5 HYPERLIPIDEMIA, UNSPECIFIED HYPERLIPIDEMIA TYPE: ICD-10-CM

## 2024-08-29 RX ORDER — ATORVASTATIN CALCIUM 10 MG/1
10 TABLET, FILM COATED ORAL DAILY
Qty: 90 TABLET | Refills: 1 | Status: SHIPPED | OUTPATIENT
Start: 2024-08-29

## 2024-09-13 DIAGNOSIS — E03.8 HYPOTHYROIDISM DUE TO HASHIMOTO'S THYROIDITIS: ICD-10-CM

## 2024-09-13 DIAGNOSIS — E06.3 HYPOTHYROIDISM DUE TO HASHIMOTO'S THYROIDITIS: ICD-10-CM

## 2024-09-13 RX ORDER — LIOTHYRONINE SODIUM 5 UG/1
TABLET ORAL
Qty: 90 TABLET | Refills: 1 | Status: SHIPPED | OUTPATIENT
Start: 2024-09-13

## 2024-09-13 RX ORDER — LEVOTHYROXINE SODIUM 100 MCG
TABLET ORAL
Qty: 90 TABLET | Refills: 1 | Status: SHIPPED | OUTPATIENT
Start: 2024-09-13 | End: 2024-09-20

## 2024-09-16 ENCOUNTER — TELEPHONE (OUTPATIENT)
Age: 49
End: 2024-09-16

## 2024-09-18 LAB
25(OH)D3+25(OH)D2 SERPL-MCNC: 27.5 NG/ML (ref 30–100)
ALBUMIN SERPL-MCNC: 4.5 G/DL (ref 3.9–4.9)
ALP SERPL-CCNC: 77 IU/L (ref 44–121)
ALT SERPL-CCNC: 15 IU/L (ref 0–32)
AST SERPL-CCNC: 18 IU/L (ref 0–40)
BILIRUB SERPL-MCNC: 1 MG/DL (ref 0–1.2)
BUN SERPL-MCNC: 11 MG/DL (ref 6–24)
BUN/CREAT SERPL: 16 (ref 9–23)
CALCIUM SERPL-MCNC: 9.2 MG/DL (ref 8.7–10.2)
CHLORIDE SERPL-SCNC: 106 MMOL/L (ref 96–106)
CHOLEST SERPL-MCNC: 176 MG/DL (ref 100–199)
CO2 SERPL-SCNC: 22 MMOL/L (ref 20–29)
CREAT SERPL-MCNC: 0.69 MG/DL (ref 0.57–1)
EGFR: 107 ML/MIN/1.73
GLOBULIN SER-MCNC: 2.4 G/DL (ref 1.5–4.5)
GLUCOSE SERPL-MCNC: 84 MG/DL (ref 70–99)
HBA1C MFR BLD: 5.6 % (ref 4.8–5.6)
HDLC SERPL-MCNC: 35 MG/DL
LDLC SERPL CALC-MCNC: 98 MG/DL (ref 0–99)
POTASSIUM SERPL-SCNC: 3.8 MMOL/L (ref 3.5–5.2)
PROT SERPL-MCNC: 6.9 G/DL (ref 6–8.5)
SL AMB VLDL CHOLESTEROL CALC: 43 MG/DL (ref 5–40)
SODIUM SERPL-SCNC: 143 MMOL/L (ref 134–144)
T3FREE SERPL-MCNC: 4 PG/ML (ref 2–4.4)
T4 FREE SERPL-MCNC: 1.28 NG/DL (ref 0.82–1.77)
TRIGL SERPL-MCNC: 252 MG/DL (ref 0–149)
TSH SERPL DL<=0.005 MIU/L-ACNC: 1.36 UIU/ML (ref 0.45–4.5)

## 2024-09-20 ENCOUNTER — OFFICE VISIT (OUTPATIENT)
Dept: ENDOCRINOLOGY | Facility: HOSPITAL | Age: 49
End: 2024-09-20
Payer: COMMERCIAL

## 2024-09-20 VITALS
HEART RATE: 94 BPM | DIASTOLIC BLOOD PRESSURE: 82 MMHG | WEIGHT: 207 LBS | BODY MASS INDEX: 38.09 KG/M2 | OXYGEN SATURATION: 95 % | SYSTOLIC BLOOD PRESSURE: 114 MMHG | HEIGHT: 62 IN

## 2024-09-20 DIAGNOSIS — R79.0 LOW FERRITIN: ICD-10-CM

## 2024-09-20 DIAGNOSIS — E06.3 HYPOTHYROIDISM DUE TO HASHIMOTO'S THYROIDITIS: Primary | ICD-10-CM

## 2024-09-20 DIAGNOSIS — R73.03 PREDIABETES: ICD-10-CM

## 2024-09-20 DIAGNOSIS — E55.9 VITAMIN D DEFICIENCY: ICD-10-CM

## 2024-09-20 DIAGNOSIS — E03.8 HYPOTHYROIDISM DUE TO HASHIMOTO'S THYROIDITIS: Primary | ICD-10-CM

## 2024-09-20 PROCEDURE — 99214 OFFICE O/P EST MOD 30 MIN: CPT | Performed by: PHYSICIAN ASSISTANT

## 2024-09-20 RX ORDER — LEVOTHYROXINE SODIUM 100 MCG
TABLET ORAL
Qty: 90 TABLET | Refills: 1 | Status: SHIPPED | OUTPATIENT
Start: 2024-09-20

## 2024-09-20 RX ORDER — ERGOCALCIFEROL 1.25 MG/1
50000 CAPSULE ORAL WEEKLY
Qty: 12 CAPSULE | Refills: 1 | Status: SHIPPED | OUTPATIENT
Start: 2024-09-20

## 2024-09-20 NOTE — PROGRESS NOTES
Mary Alice Willoughby 48 y.o. female MRN: [id number]    Encounter: [id number]      Assessment & Plan     Assessment:  This is a 48 y.o.-year-old female with hypothyroidism due to Hashimoto's thyroiditis and prediabetes.    Plan:  1.  Hypothyroidism due to Hashimoto's thyroiditis: Most recent thyroid lab work came back normal.  Clinically and biochemically euthyroid.  At this time she will take Synthroid 100 mcg 1 tablet 6 days a week and 2 tablets on Sunday along with liothyronine 5 mcg daily.  Contact the office if there is 80 change in symptoms.  Follow-up in 6 months with lab work completed prior to visit.     2.  Prediabetes: Most recent hemoglobin A1c is 5.6.  No concerns at this time.  She will continue with metformin  mg daily.  Will continue to monitor A1c over time.     3.  Hyperlipidemia: Significant improvement in lipid panel with starting atorvastatin.  Triglycerides are still slightly elevated, but continue lifestyle modifications may help out.  Continue with medication at this time.  We will continue to monitor.     4.  Iron deficiency: Fortunately have not received results of recent CBC or iron panel.  Will contact the lab to make sure that they were drawn.    5.  Vitamin D deficiency: Continues with over-the-counter vitamin D supplementation, but has not been able to get vitamin D within normal range.  At this time we will start vitamin D 50,000 units weekly to see if we can get vitamin D within normal range.    CC: Hypothyroidism follow-up    History of Present Illness     HPI:  Mary Alice Willoughby is a 48 year old female with hypothyroidism due to Hashimoto's thyroiditis, vitamin-D deficiency, insulin resistance with weight gain for follow-up visit.  Has not been seen since March 2022.      She developed hypothyroidism due to Hashimoto's thyroiditis around 2011.  She has been on thyroid hormone for replacement purposes ever since.  She did at 1 point take Armor Thyroid but switched to a combination  of Synthroid and liothyronine in the summer 2021.  She has a history of thyroid nodules which were biopsied by ENT with inconclusive biopsy results but the nodules have since resolved on ultrasound.       She takes Synthroid brand 100 mcg 6 days a week and 2 tablets on Sunday and liothyronine 5 mcg daily.  Continues to have fatigue at this time, was going to be evaluated by sleep medicine for sleep apnea. She has been experiencing cold intolerance, fatigue, muscle aches and joint pain.  Subjective weakness in extremities.  She denies heat intolerance. She denies palpitation, tremors, or weight changes.  She denies diarrhea or constipation.  Is once again concerned about hair loss.  She has dry skin and brittle nails. She denies diplopia.  Was having issues with her menstrual cycles last few months.  Is following up with OB/GYN, and is concerned for iron deficiency at this time.  She denies compressive thyroid symptoms or difficulties with swallowing.     She has insulin resistance and weight gain.  Has been dieting exercise as best as able.  Diabetes range in the past.  Was started on metformin  mg daily summer 2023.  Has not had any side effects with medication.  Has gained 7 pounds since last office visit. She denies polyuria, polydipsia, or polyphagia.  She has nocturia due to drinking a lot of water.  She denies blurry vision.  She has been having some paresthesia in bilateral hands.  Is continuing to make lifestyle modifications which was helping with her weight, grossly has gained about 7 pounds.  Is looking into making adjustment to her current routine to see if this will help with weight and glucose levels.     She has a history of vitamin-D deficiency and takes vitamin-D 5030-7890 units daily.  Does admit she misses doses every once in a while.     Has been diagnosed with hyperlipidemia.  Is currently on atorvastatin 10 mg daily.  Has not had any side effects with medication.  Denies any headaches,  chest pain, MI or strokelike symptoms.    Review of Systems   Constitutional:  Positive for fatigue. Negative for activity change, appetite change and unexpected weight change.   HENT:  Negative for sore throat and trouble swallowing.    Eyes:  Negative for visual disturbance.   Respiratory:  Negative for chest tightness and shortness of breath.    Cardiovascular:  Negative for chest pain, palpitations and leg swelling.   Gastrointestinal:  Negative for abdominal pain, constipation, diarrhea, nausea and vomiting.   Endocrine: Positive for cold intolerance. Negative for heat intolerance, polydipsia, polyphagia and polyuria.   Genitourinary:  Negative for frequency.   Musculoskeletal:  Positive for arthralgias and myalgias.   Skin:  Negative for wound.        Brittle hair and hair loss   Neurological:  Positive for headaches. Negative for dizziness, weakness and numbness.        Paresthesia in bilateral hands   Psychiatric/Behavioral:  Negative for dysphoric mood and sleep disturbance. The patient is not nervous/anxious.        Historical Information   Past Medical History:   Diagnosis Date    Asthma     induced by exercise or smoke.    Deviated septum     GERD (gastroesophageal reflux disease)     Intracranial aneurysm 2023    Nasal polyps      Past Surgical History:   Procedure Laterality Date     SECTION      X 2     Social History   Social History     Substance and Sexual Activity   Alcohol Use Never     Social History     Substance and Sexual Activity   Drug Use Never     Social History     Tobacco Use   Smoking Status Never   Smokeless Tobacco Never     Family History:   Family History   Problem Relation Age of Onset    Hypertension Mother     Hashimoto's thyroiditis Mother     Hypertension Father     Sleep apnea Father     Hypertension Brother     Thyroid disease unspecified Brother     Sleep apnea Brother     Hyperlipidemia Brother     Autism spectrum disorder Brother         suspected    No  "Known Problems Son     No Known Problems Daughter     Diabetes type II Maternal Grandmother        Meds/Allergies   Current Outpatient Medications   Medication Sig Dispense Refill    Advair Diskus 100-50 MCG/ACT inhaler Inhale 1 puff every 12 (twelve) hours      albuterol (PROVENTIL HFA,VENTOLIN HFA) 90 mcg/act inhaler       Ascorbic Acid (Vitamin C) 500 MG CAPS Take by mouth daily With vitamin D 1000 units and zinc      atorvastatin (LIPITOR) 10 mg tablet TAKE 1 TABLET BY MOUTH EVERY DAY 90 tablet 1    Cholecalciferol (Vitamin D3) 50 MCG (2000 UT) TABS 1003-3182 units daily      COLLAGEN PO Take by mouth Powder daily      ergocalciferol (ERGOCALCIFEROL) 1.25 MG (16161 UT) capsule Take 1 capsule (50,000 Units total) by mouth once a week 12 capsule 1    Lactobacillus (PROBIOTIC ACIDOPHILUS PO) Take by mouth daily      liothyronine (CYTOMEL) 5 mcg tablet TAKE 1 TABLET BY MOUTH EVERY DAY 90 tablet 1    losartan (COZAAR) 50 mg tablet Take 50 mg by mouth daily        Magnesium 200 MG TABS Take by mouth daily      metFORMIN (GLUCOPHAGE-XR) 500 mg 24 hr tablet TAKE 1 TABLET BY MOUTH EVERY DAY WITH BREAKFAST 90 tablet 1    mometasone (NASONEX) 50 mcg/act nasal spray every 24 hours      Multiple Vitamins-Minerals (MULTIVITAMIN WOMEN PO) Take by mouth daily      Omega-3 Fatty Acids (fish oil) 1,000 mg Take 1,000 mg by mouth daily      omeprazole (PriLOSEC) 20 mg delayed release capsule Take 20 mg by mouth daily      Synthroid 100 MCG tablet 1 tablet 6 days a week and 2 tablets on Sunday 90 tablet 1    zinc gluconate 50 mg tablet Take 50 mg by mouth daily      budesonide-formoterol (SYMBICORT) 160-4.5 mcg/act inhaler Inhale 2 puffs 2 (two) times a day Rinse mouth after use. (Patient not taking: Reported on 9/15/2023)       No current facility-administered medications for this visit.     Allergies   Allergen Reactions    Sulfamethoxazole-Trimethoprim Hives       Objective   Vitals: Blood pressure 114/82, pulse 94, height 5' 2\" " "(1.575 m), weight 93.9 kg (207 lb), SpO2 95%.    Physical Exam  Vitals and nursing note reviewed.   Constitutional:       General: She is not in acute distress.     Appearance: Normal appearance. She is not diaphoretic.   HENT:      Head: Normocephalic and atraumatic.   Eyes:      General: No scleral icterus.     Extraocular Movements: Extraocular movements intact.      Conjunctiva/sclera: Conjunctivae normal.      Pupils: Pupils are equal, round, and reactive to light.   Neck:      Thyroid: No thyroid mass, thyromegaly or thyroid tenderness.   Cardiovascular:      Rate and Rhythm: Normal rate and regular rhythm.      Heart sounds: No murmur heard.  Pulmonary:      Effort: Pulmonary effort is normal. No respiratory distress.      Breath sounds: Normal breath sounds. No wheezing.   Musculoskeletal:      Cervical back: Normal range of motion.      Right lower leg: No edema.      Left lower leg: No edema.   Lymphadenopathy:      Cervical: No cervical adenopathy.   Neurological:      Mental Status: She is alert and oriented to person, place, and time. Mental status is at baseline.      Sensory: No sensory deficit.      Gait: Gait normal.   Psychiatric:         Mood and Affect: Mood normal.         Behavior: Behavior normal.         Thought Content: Thought content normal.         The history was obtained from the review of the chart, patient.    Lab Results:   Lab Results   Component Value Date/Time    Free t4 1.28 09/17/2024 01:28 PM    Free t4 1.3 03/13/2024 11:03 AM           Portions of the record may have been created with voice recognition software. Occasional wrong word or \"sound a like\" substitutions may have occurred due to the inherent limitations of voice recognition software. Read the chart carefully and recognize, using context, where substitutions have occurred.    "

## 2024-10-01 ENCOUNTER — TELEPHONE (OUTPATIENT)
Age: 49
End: 2024-10-01

## 2024-10-01 NOTE — TELEPHONE ENCOUNTER
Can you call [facility]. I think we called them and they said that they would be able to add it on. I could be wrong.

## 2024-10-01 NOTE — TELEPHONE ENCOUNTER
Spoke with [facility], they were able to add on the iron panel per previously discussed. The CBC was not able to be added.

## 2024-10-01 NOTE — TELEPHONE ENCOUNTER
Results of recent iron panel.  Iron levels are doing well, but I do not have a CBC.  I would expect CBC to be doing well though.  If she wants these results, unfortunately she is going to have to go back to the lab to get that drawn.  They were not unable to add it to the previous order.

## 2024-10-01 NOTE — TELEPHONE ENCOUNTER
Patient called- she wanted to know if her iron panel was back yet. It was not drawn as far as I can tell. There was one ordered on 9/20 but for 3/2025. Can she have an iron panel ordered for now so she can have it done? She is most concerned with this. Please advise, [phone].

## 2024-10-02 LAB
IRON SATN MFR SERPL: 20 % (ref 15–55)
IRON SERPL-MCNC: 76 UG/DL (ref 27–159)
TIBC SERPL-MCNC: 376 UG/DL (ref 250–450)
UIBC SERPL-MCNC: 300 UG/DL (ref 131–425)

## 2024-10-03 NOTE — TELEPHONE ENCOUNTER
Patient returning call and made aware:    Jacek Cabrera PA-C  to [facility]     10/1/24  4:09 PM  Note      Results of recent iron panel.  Iron levels are doing well, but I do not have a CBC.  I would expect CBC to be doing well though.  If she wants these results, unfortunately she is going to have to go back to the lab to get that drawn.  They were not unable to add it to the previous order.        Patient verbalized understanding. Patient asking if she can have an order to check ferritin level as well and CBC order faxed to [facility].  [phone]. States her hair is thinning and breaking off. Please advise, thank you.

## 2024-10-03 NOTE — TELEPHONE ENCOUNTER
Per Kayley's note: Patient verbalized understanding. Patient asking if she can have an order to check ferritin level as well and CBC order faxed to [facility].  [phone]. States her hair is thinning and breaking off. Please advise, thank you.

## 2024-10-04 DIAGNOSIS — R53.83 OTHER FATIGUE: Primary | ICD-10-CM

## 2024-11-18 DIAGNOSIS — R73.03 PREDIABETES: ICD-10-CM

## 2024-11-18 RX ORDER — METFORMIN HYDROCHLORIDE 500 MG/1
500 TABLET, EXTENDED RELEASE ORAL
Qty: 90 TABLET | Refills: 1 | Status: SHIPPED | OUTPATIENT
Start: 2024-11-18

## 2024-11-18 NOTE — TELEPHONE ENCOUNTER
Reason for call:   [x] Refill   [] Prior Auth  [] Other:     Office:   [] PCP/Provider -   [x] Specialty/Provider - Endo    Medication: metFORMIN (GLUCOPHAGE-XR) 500 mg 24 hr tablet TAKE 1 TABLET BY MOUTH EVERY DAY WITH BREAKFAST       Pharmacy: [facility]/pharmacy #[id number] - [place] - [address]      Does the patient have enough for 3 days?   [] Yes   [x] No - Send as HP to POD

## 2024-12-13 ENCOUNTER — TELEPHONE (OUTPATIENT)
Dept: ENDOCRINOLOGY | Facility: HOSPITAL | Age: 49
End: 2024-12-13

## 2024-12-13 NOTE — TELEPHONE ENCOUNTER
Patient called rx refill line to request medication be changed to a generic option. She stated when she saw Dr morin they discussed changing her thyroid meds to a cheaper option when she was due for her next refill.

## 2024-12-17 DIAGNOSIS — E06.3 HYPOTHYROIDISM DUE TO HASHIMOTO'S THYROIDITIS: ICD-10-CM

## 2024-12-17 RX ORDER — LEVOTHYROXINE SODIUM 100 UG/1
TABLET ORAL
Qty: 90 TABLET | Refills: 1 | Status: SHIPPED | OUTPATIENT
Start: 2024-12-17 | End: 2024-12-23 | Stop reason: SDUPTHER

## 2024-12-23 ENCOUNTER — NURSE TRIAGE (OUTPATIENT)
Age: 49
End: 2024-12-23

## 2024-12-23 DIAGNOSIS — E06.3 HYPOTHYROIDISM DUE TO HASHIMOTO'S THYROIDITIS: ICD-10-CM

## 2024-12-23 RX ORDER — LEVOTHYROXINE SODIUM 100 UG/1
TABLET ORAL
Qty: 90 TABLET | Refills: 1 | Status: SHIPPED | OUTPATIENT
Start: 2024-12-23

## 2024-12-23 NOTE — TELEPHONE ENCOUNTER
"See previous telephone note, patient had called again today about the levothyroxine as follows:    \"12/23/24  3:13 PM  Note      Patient asking if levothyroxine can be re sent to [facility]. Thank you. \"          The levothyroxine was already sent on 12/17/24 by Dr Becker for #90 R1, and we do have receipt confirmed by pharmacy. Called and spoke with pharmacist at [facility], he states they do not have that prescription they only have the last prescription for the brand Synthroid.  Will resend the levothyroxine as per protocol.     Called patient to make her aware.       Reason for Disposition   Prescription prescribed recently is not at pharmacy and triager has access to patient's EMR and prescription is recorded in the EMR    Answer Assessment - Initial Assessment Questions  1. NAME of MEDICINE: \"What medicine(s) are you calling about?\"      Levothyroxine 100 mcg  2. QUESTION: \"What is your question?\" (e.g., double dose of medicine, side effect)      Was not received by the pharmacy  3. PRESCRIBER: \"Who prescribed the medicine?\" Reason: if prescribed by specialist, call should be referred to that group.      Dr Becker    Protocols used: Medication Question Call-Adult-[place]    "

## 2025-02-07 DIAGNOSIS — E78.5 HYPERLIPIDEMIA, UNSPECIFIED HYPERLIPIDEMIA TYPE: ICD-10-CM

## 2025-02-07 RX ORDER — ATORVASTATIN CALCIUM 10 MG/1
10 TABLET, FILM COATED ORAL DAILY
Qty: 90 TABLET | Refills: 0 | Status: SHIPPED | OUTPATIENT
Start: 2025-02-07

## 2025-03-13 ENCOUNTER — TELEPHONE (OUTPATIENT)
Age: 50
End: 2025-03-13

## 2025-03-13 DIAGNOSIS — R73.03 PREDIABETES: ICD-10-CM

## 2025-03-13 RX ORDER — METFORMIN HYDROCHLORIDE 500 MG/1
500 TABLET, EXTENDED RELEASE ORAL
Qty: 90 TABLET | Refills: 1 | Status: SHIPPED | OUTPATIENT
Start: 2025-03-13 | End: 2025-03-20 | Stop reason: SDUPTHER

## 2025-03-15 LAB
25(OH)D3+25(OH)D2 SERPL-MCNC: 39.1 NG/ML (ref 30–100)
ALBUMIN SERPL-MCNC: 4.3 G/DL (ref 3.9–4.9)
ALP SERPL-CCNC: 83 IU/L (ref 44–121)
ALT SERPL-CCNC: 18 IU/L (ref 0–32)
AST SERPL-CCNC: 21 IU/L (ref 0–40)
BASOPHILS # BLD AUTO: 0.1 X10E3/UL (ref 0–0.2)
BASOPHILS NFR BLD AUTO: 1 %
BILIRUB SERPL-MCNC: 1.1 MG/DL (ref 0–1.2)
BUN SERPL-MCNC: 18 MG/DL (ref 6–24)
BUN/CREAT SERPL: 25 (ref 9–23)
CALCIUM SERPL-MCNC: 9.1 MG/DL (ref 8.7–10.2)
CHLORIDE SERPL-SCNC: 105 MMOL/L (ref 96–106)
CHOLEST SERPL-MCNC: 150 MG/DL (ref 100–199)
CO2 SERPL-SCNC: 22 MMOL/L (ref 20–29)
CREAT SERPL-MCNC: 0.72 MG/DL (ref 0.57–1)
EGFR: 102 ML/MIN/1.73
EOSINOPHIL # BLD AUTO: 0.7 X10E3/UL (ref 0–0.4)
EOSINOPHIL NFR BLD AUTO: 9 %
ERYTHROCYTE [DISTWIDTH] IN BLOOD BY AUTOMATED COUNT: 12.8 % (ref 11.7–15.4)
GLOBULIN SER-MCNC: 2.5 G/DL (ref 1.5–4.5)
GLUCOSE SERPL-MCNC: 88 MG/DL (ref 70–99)
HBA1C MFR BLD: 5.4 % (ref 4.8–5.6)
HCT VFR BLD AUTO: 36.8 % (ref 34–46.6)
HDLC SERPL-MCNC: 32 MG/DL
HGB BLD-MCNC: 12.5 G/DL (ref 11.1–15.9)
IMM GRANULOCYTES # BLD: 0 X10E3/UL (ref 0–0.1)
IMM GRANULOCYTES NFR BLD: 0 %
IRON SERPL-MCNC: 84 UG/DL (ref 27–159)
LDLC SERPL CALC-MCNC: 84 MG/DL (ref 0–99)
LYMPHOCYTES # BLD AUTO: 2.9 X10E3/UL (ref 0.7–3.1)
LYMPHOCYTES NFR BLD AUTO: 36 %
MCH RBC QN AUTO: 29.8 PG (ref 26.6–33)
MCHC RBC AUTO-ENTMCNC: 34 G/DL (ref 31.5–35.7)
MCV RBC AUTO: 88 FL (ref 79–97)
MONOCYTES # BLD AUTO: 0.7 X10E3/UL (ref 0.1–0.9)
MONOCYTES NFR BLD AUTO: 8 %
NEUTROPHILS # BLD AUTO: 3.7 X10E3/UL (ref 1.4–7)
NEUTROPHILS NFR BLD AUTO: 46 %
PLATELET # BLD AUTO: 329 X10E3/UL (ref 150–450)
POTASSIUM SERPL-SCNC: 4.1 MMOL/L (ref 3.5–5.2)
PROT SERPL-MCNC: 6.8 G/DL (ref 6–8.5)
RBC # BLD AUTO: 4.19 X10E6/UL (ref 3.77–5.28)
SL AMB VLDL CHOLESTEROL CALC: 34 MG/DL (ref 5–40)
SODIUM SERPL-SCNC: 141 MMOL/L (ref 134–144)
T4 FREE SERPL-MCNC: 1.15 NG/DL (ref 0.82–1.77)
TRIGL SERPL-MCNC: 202 MG/DL (ref 0–149)
TSH SERPL DL<=0.005 MIU/L-ACNC: 0.41 UIU/ML (ref 0.45–4.5)
WBC # BLD AUTO: 8.1 X10E3/UL (ref 3.4–10.8)

## 2025-03-17 ENCOUNTER — RESULTS FOLLOW-UP (OUTPATIENT)
Dept: ENDOCRINOLOGY | Facility: HOSPITAL | Age: 50
End: 2025-03-17

## 2025-03-19 DIAGNOSIS — E06.3 HYPOTHYROIDISM DUE TO HASHIMOTO'S THYROIDITIS: ICD-10-CM

## 2025-03-19 RX ORDER — LIOTHYRONINE SODIUM 5 UG/1
5 TABLET ORAL DAILY
Qty: 90 TABLET | Refills: 1 | Status: SHIPPED | OUTPATIENT
Start: 2025-03-19

## 2025-03-20 ENCOUNTER — OFFICE VISIT (OUTPATIENT)
Dept: ENDOCRINOLOGY | Facility: HOSPITAL | Age: 50
End: 2025-03-20
Payer: COMMERCIAL

## 2025-03-20 VITALS
HEIGHT: 62 IN | HEART RATE: 80 BPM | WEIGHT: 199.4 LBS | BODY MASS INDEX: 36.7 KG/M2 | SYSTOLIC BLOOD PRESSURE: 108 MMHG | DIASTOLIC BLOOD PRESSURE: 80 MMHG

## 2025-03-20 DIAGNOSIS — E88.819 INSULIN RESISTANCE: ICD-10-CM

## 2025-03-20 DIAGNOSIS — R79.0 LOW FERRITIN: ICD-10-CM

## 2025-03-20 DIAGNOSIS — E78.2 MIXED HYPERLIPIDEMIA: ICD-10-CM

## 2025-03-20 DIAGNOSIS — E06.3 HYPOTHYROIDISM DUE TO HASHIMOTO'S THYROIDITIS: Primary | ICD-10-CM

## 2025-03-20 DIAGNOSIS — E55.9 VITAMIN D DEFICIENCY: ICD-10-CM

## 2025-03-20 DIAGNOSIS — R73.03 PREDIABETES: ICD-10-CM

## 2025-03-20 DIAGNOSIS — E78.5 HYPERLIPIDEMIA, UNSPECIFIED HYPERLIPIDEMIA TYPE: ICD-10-CM

## 2025-03-20 DIAGNOSIS — E66.812 CLASS 2 OBESITY WITHOUT SERIOUS COMORBIDITY WITH BODY MASS INDEX (BMI) OF 36.0 TO 36.9 IN ADULT, UNSPECIFIED OBESITY TYPE: ICD-10-CM

## 2025-03-20 PROCEDURE — 99214 OFFICE O/P EST MOD 30 MIN: CPT | Performed by: INTERNAL MEDICINE

## 2025-03-20 RX ORDER — ATORVASTATIN CALCIUM 10 MG/1
10 TABLET, FILM COATED ORAL DAILY
Qty: 90 TABLET | Refills: 1 | Status: SHIPPED | OUTPATIENT
Start: 2025-03-20

## 2025-03-20 RX ORDER — FERROUS GLUCONATE 324(38)MG
324 TABLET ORAL 2 TIMES WEEKLY
COMMUNITY

## 2025-03-20 RX ORDER — ERGOCALCIFEROL 1.25 MG/1
50000 CAPSULE ORAL WEEKLY
Qty: 12 CAPSULE | Refills: 1 | Status: SHIPPED | OUTPATIENT
Start: 2025-03-20

## 2025-03-20 RX ORDER — METFORMIN HYDROCHLORIDE 500 MG/1
500 TABLET, EXTENDED RELEASE ORAL 2 TIMES DAILY WITH MEALS
Qty: 180 TABLET | Refills: 1 | Status: SHIPPED | OUTPATIENT
Start: 2025-03-20

## 2025-03-20 NOTE — PROGRESS NOTES
3/21/2025    Assessment & Plan      Diagnoses and all orders for this visit:    Hypothyroidism due to Hashimoto's thyroiditis  -     Cancel: T4, free; Future  -     Cancel: T3, free; Future  -     Cancel: TSH, 3rd generation; Future  -     T3, free; Future  -     T4, free; Future  -     TSH, 3rd generation; Future  -     T3, free  -     T4, free  -     TSH, 3rd generation  -     Comprehensive metabolic panel; Future  -     CBC and differential; Future  -     T3, free; Future  -     T4, free; Future  -     TSH, 3rd generation; Future  -     Vitamin D 25 hydroxy; Future  -     Ferritin; Future  -     Hemoglobin A1C; Future  -     Ferritin; Future  -     Lipid Panel with Direct LDL reflex; Future  -     Comprehensive metabolic panel  -     CBC and differential  -     T3, free  -     T4, free  -     TSH, 3rd generation  -     Vitamin D 25 hydroxy  -     Ferritin  -     Hemoglobin A1C  -     Ferritin  -     Lipid Panel with Direct LDL reflex    Insulin resistance  -     Comprehensive metabolic panel; Future  -     CBC and differential; Future  -     T3, free; Future  -     T4, free; Future  -     TSH, 3rd generation; Future  -     Vitamin D 25 hydroxy; Future  -     Ferritin; Future  -     Hemoglobin A1C; Future  -     Ferritin; Future  -     Lipid Panel with Direct LDL reflex; Future  -     Comprehensive metabolic panel  -     CBC and differential  -     T3, free  -     T4, free  -     TSH, 3rd generation  -     Vitamin D 25 hydroxy  -     Ferritin  -     Hemoglobin A1C  -     Ferritin  -     Lipid Panel with Direct LDL reflex    Class 2 obesity without serious comorbidity with body mass index (BMI) of 36.0 to 36.9 in adult, unspecified obesity type  -     Comprehensive metabolic panel; Future  -     CBC and differential; Future  -     T3, free; Future  -     T4, free; Future  -     TSH, 3rd generation; Future  -     Vitamin D 25 hydroxy; Future  -     Ferritin; Future  -     Hemoglobin A1C; Future  -     Ferritin;  Future  -     Lipid Panel with Direct LDL reflex; Future  -     Comprehensive metabolic panel  -     CBC and differential  -     T3, free  -     T4, free  -     TSH, 3rd generation  -     Vitamin D 25 hydroxy  -     Ferritin  -     Hemoglobin A1C  -     Ferritin  -     Lipid Panel with Direct LDL reflex    Mixed hyperlipidemia  -     Comprehensive metabolic panel; Future  -     CBC and differential; Future  -     T3, free; Future  -     T4, free; Future  -     TSH, 3rd generation; Future  -     Vitamin D 25 hydroxy; Future  -     Ferritin; Future  -     Hemoglobin A1C; Future  -     Ferritin; Future  -     Lipid Panel with Direct LDL reflex; Future  -     Comprehensive metabolic panel  -     CBC and differential  -     T3, free  -     T4, free  -     TSH, 3rd generation  -     Vitamin D 25 hydroxy  -     Ferritin  -     Hemoglobin A1C  -     Ferritin  -     Lipid Panel with Direct LDL reflex    Vitamin D deficiency  -     Comprehensive metabolic panel; Future  -     CBC and differential; Future  -     T3, free; Future  -     T4, free; Future  -     TSH, 3rd generation; Future  -     Vitamin D 25 hydroxy; Future  -     Ferritin; Future  -     Hemoglobin A1C; Future  -     Ferritin; Future  -     Lipid Panel with Direct LDL reflex; Future  -     Comprehensive metabolic panel  -     CBC and differential  -     T3, free  -     T4, free  -     TSH, 3rd generation  -     Vitamin D 25 hydroxy  -     Ferritin  -     Hemoglobin A1C  -     Ferritin  -     Lipid Panel with Direct LDL reflex  -     ergocalciferol (ERGOCALCIFEROL) 1.25 MG (02769 UT) capsule; Take 1 capsule (50,000 Units total) by mouth once a week    Low ferritin  -     Ferritin  -     Comprehensive metabolic panel; Future  -     CBC and differential; Future  -     T3, free; Future  -     T4, free; Future  -     TSH, 3rd generation; Future  -     Vitamin D 25 hydroxy; Future  -     Ferritin; Future  -     Hemoglobin A1C; Future  -     Ferritin; Future  -      Lipid Panel with Direct LDL reflex; Future  -     Comprehensive metabolic panel  -     CBC and differential  -     T3, free  -     T4, free  -     TSH, 3rd generation  -     Vitamin D 25 hydroxy  -     Ferritin  -     Hemoglobin A1C  -     Ferritin  -     Lipid Panel with Direct LDL reflex    Prediabetes  -     metFORMIN (GLUCOPHAGE-XR) 500 mg 24 hr tablet; Take 1 tablet (500 mg total) by mouth 2 (two) times a day with meals    Hyperlipidemia, unspecified hyperlipidemia type  -     atorvastatin (LIPITOR) 10 mg tablet; Take 1 tablet (10 mg total) by mouth daily    Other orders  -     ferrous gluconate (FERGON) 324 mg tablet; Take 324 mg by mouth 2 (two) times a week  -     Biotin w/ Vitamins C & E (HAIR SKIN & NAILS GUMMIES PO); Take by mouth in the morning        Assessment & Plan  1. Hypothyroidism due to Hashimoto's thyroiditis.  Her TSH levels are marginally low at 0.414, indicating a slightly overactive thyroid. This may necessitate a reduction in medication dosage over time; however, it is prudent to allow for further observation before making any adjustments. She was advised to discontinue biotin a few days prior to any blood tests and then resume its use. A repeat thyroid test has been ordered for 3 months from now.    2. Insulin resistance with obesity.  Her triglyceride levels are elevated, and her HDL levels are low, which are typical indicators of insulin resistance. Her total cholesterol and LDL levels are within normal limits. Her BUN/creatinine ratio suggests possible dehydration. Her BMI is currently 36. The dosage of metformin will be increased to 500 mg twice daily. She was advised to maintain adequate hydration and continue her current regimen of atorvastatin 10 mg daily and losartan. A prescription for vitamin B12 500 to 1000 mcg daily was provided to potentially alleviate her symptoms.    3. Vitamin D deficiency.  Her vitamin D levels have improved but remain at the lower end of the normal range  at 39. She will continue her current vitamin D supplementation regimen of 50,000 IU once a week and 2,000 IU daily.    4. Iron deficiency/low ferritin level.  She reported heavy menstrual bleeding and intermittent use of iron supplements due to constipation. A ferritin level test has been ordered to assess her iron stores.    5.  Hyperlipidemia.  She will continue her current regimen of atorvastatin 10 mg daily. A refill for atorvastatin was provided.  She can utilize coenzyme Q 10 with her statin.    6.  Prediabetes.  She will increase her metformin to twice daily.  She will try vitamin B12 500 to 1000 mcg daily in an attempt to alleviate some neuropathic symptoms.    I have asked her to get a blood work performed in 3 months consisting of a TSH, free T3, and free T4.    She will get a ferritin level performed at her earliest convenience.    I have asked her to follow-up in 6 months with preceding CMP, CBC, TSH, free T4, free T3, ferritin level, hemoglobin A1c, lipid panel, and 25-hydroxy vitamin D level.        CC: Hypothyroid vitamin D deficiency, obesity, prediabetes follow-up    History of Present Illness    HPI: Mary Alice Willoughby is a 49-year-old female with a history of hypothyroidism due to Hashimoto's thyroiditis, vitamin D deficiency, and insulin resistance with weight gain, here for a follow-up visit.    She developed hypothyroidism due to Hashimoto's thyroiditis around 2011 and has been on thyroid hormone replacement ever since. Initially, she took [place] Thyroid but switched to a combination of Synthroid and liothyronine in the summer of 2021.     Recently, she transitioned from Synthroid to levothyroxine 100 mcg, taken as one pill six days a week and two pills on Sunday, due to insurance coverage issues. She continues to take liothyronine 5 mcg daily. She reports feeling fatigued and has noticed a decrease in her thyroid levels compared to previous readings. She is not experiencing any tremors,  palpitations, or difficulty swallowing but does report dry skin and brittle nails. She also reports hair loss and breakage despite using protective hair products and taking biotin supplements. She has a history of thyroid nodules, which were biopsied by an ENT with inconclusive results, but the nodules have since resolved on ultrasound.    She has a history of insulin resistance and weight gain with obesity and is now prediabetic. She is currently on metformin  mg once daily and is considering increasing the dosage to twice daily. She reports no significant gastrointestinal issues. Despite dietary modifications and increased physical activity, including walking approximately 14,000 steps per night, she has not observed any weight loss. She experiences increased hunger at night and often snacks after returning home from work at 11 PM. She is considering the use of Mounjaro or Ozempic for weight loss.    She is currently taking atorvastatin 10 mg daily for cholesterol management and losartan for blood pressure control, both administered in the evening. She reports occasional nighttime urination and increased thirst at night.    She is currently taking vitamin D 50,000 units once a week and 2000 units daily if she is consistent. She is also taking iron supplements twice a week due to constipation. She is taking biotin supplements for hair, skin, and nails.    Supplemental Information  She is in the perimenopausal stage and experiences intermittent heavy periods. She reports no hot flashes. She has been experiencing hair breakage for years, which has worsened recently. She is taking biotin. She is wondering if it could be estrogen causing it. She is taking iron supplements twice a week due to constipation. She is taking biotin supplements for hair, skin, and nails.         Historical Information   Past Medical History:   Diagnosis Date    Asthma     induced by exercise or smoke.    Deviated septum     GERD  (gastroesophageal reflux disease)     Intracranial aneurysm 2023    Nasal polyps      Past Surgical History:   Procedure Laterality Date     SECTION      X 2     Social History   Social History     Substance and Sexual Activity   Alcohol Use Never     Social History     Substance and Sexual Activity   Drug Use Never     Social History     Tobacco Use   Smoking Status Never   Smokeless Tobacco Never     Family History:   Family History   Problem Relation Age of Onset    Hypertension Mother     Hashimoto's thyroiditis Mother     Hypertension Father     Sleep apnea Father     Hypertension Brother     Thyroid disease unspecified Brother     Sleep apnea Brother     Hyperlipidemia Brother     Autism spectrum disorder Brother         suspected    No Known Problems Son     No Known Problems Daughter     Diabetes type II Maternal Grandmother        Meds/Allergies   Current Outpatient Medications   Medication Sig Dispense Refill    Advair Diskus 100-50 MCG/ACT inhaler Inhale 1 puff every 12 (twelve) hours      albuterol (PROVENTIL HFA,VENTOLIN HFA) 90 mcg/act inhaler       Ascorbic Acid (Vitamin C) 500 MG CAPS Take by mouth daily With vitamin D 1000 units and zinc      atorvastatin (LIPITOR) 10 mg tablet Take 1 tablet (10 mg total) by mouth daily 90 tablet 1    Biotin w/ Vitamins C & E (HAIR SKIN & NAILS GUMMIES PO) Take by mouth in the morning      Cholecalciferol (Vitamin D3) 50 MCG (2000 UT) TABS 0484-1586 units daily (Patient taking differently: Take 2,000 Units by mouth)      COLLAGEN PO Take by mouth Powder daily      ergocalciferol (ERGOCALCIFEROL) 1.25 MG (83699 UT) capsule Take 1 capsule (50,000 Units total) by mouth once a week 12 capsule 1    ferrous gluconate (FERGON) 324 mg tablet Take 324 mg by mouth 2 (two) times a week      Lactobacillus (PROBIOTIC ACIDOPHILUS PO) Take by mouth daily      levothyroxine (Synthroid) 100 mcg tablet 1 tablet 6 days a week and 2 tablets on  90 tablet 1     "liothyronine (CYTOMEL) 5 mcg tablet TAKE 1 TABLET BY MOUTH EVERY DAY 90 tablet 1    losartan (COZAAR) 50 mg tablet Take 50 mg by mouth daily        Magnesium 200 MG TABS Take by mouth daily      metFORMIN (GLUCOPHAGE-XR) 500 mg 24 hr tablet Take 1 tablet (500 mg total) by mouth 2 (two) times a day with meals 180 tablet 1    mometasone (NASONEX) 50 mcg/act nasal spray every 24 hours      Multiple Vitamins-Minerals (MULTIVITAMIN WOMEN PO) Take by mouth daily      Omega-3 Fatty Acids (fish oil) 1,000 mg Take 1,000 mg by mouth daily      omeprazole (PriLOSEC) 20 mg delayed release capsule Take 20 mg by mouth daily      zinc gluconate 50 mg tablet Take 50 mg by mouth daily      budesonide-formoterol (SYMBICORT) 160-4.5 mcg/act inhaler Inhale 2 puffs 2 (two) times a day Rinse mouth after use. (Patient not taking: Reported on 9/15/2023)       No current facility-administered medications for this visit.     Allergies   Allergen Reactions    Sulfamethoxazole-Trimethoprim Hives       Objective   Vitals: Blood pressure 108/80, pulse 80, height 5' 2\" (1.575 m), weight 90.4 kg (199 lb 6.4 oz).  Invasive Devices       None                   Physical Exam    HEENT: No lid lag, stare, proptosis, or periorbital edema.  Neck: Thyroid normal in size.  No palpable thyroid nodules.  No lymphadenopathy of the neck.  Lungs: Clear to auscultation.  Coronary: Regular rate and rhythm.  No murmurs.  Extremities: No tremor of the outstretched hands.  No lower extremity edema.  Neurologic: Patellar deep tendon reflexes normal.      The history was obtained from the review of the chart and from the patient.    Lab Results:      Blood work performed on 3/14/2025 showed a hemoglobin A1c of 5.4%.    CBC is normal.    Iron level is 84.    25-hydroxy vitamin D level 39.1.    TSH is 0.414 with a free T4 of 1.15.    CMP showed glucose of 88 fasting, BUN/creatinine ratio 25, but was otherwise normal.    Lab Results   Component Value Date    CREATININE " 0.72 03/14/2025    CREATININE 0.69 09/17/2024    CREATININE 0.71 03/13/2024    BUN 18 03/14/2025    K 4.1 03/14/2025     03/14/2025    CO2 22 03/14/2025     eGFR   Date Value Ref Range Status   03/14/2025 102 >59 mL/min/1.73 Final   03/13/2024 105 > OR = 60 mL/min/1.73m2 Final   10/20/2023 90 ml/min/1.73sq m Final     Total cholesterol 150, LDL cholesterol 84.    Lab Results   Component Value Date    HDL 32 (L) 03/14/2025    TRIG 202 (H) 03/14/2025       Lab Results   Component Value Date    ALT 18 03/14/2025    AST 21 03/14/2025    ALKPHOS 68 03/13/2024       Lab Results   Component Value Date    TSH 0.414 (L) 03/14/2025    FREET4 1.15 03/14/2025             Future Appointments   Date Time Provider Department Center   4/21/2025 11:15 AM [facility] 1 [facility]  [facility]   5/2/2025  9:15 AM Lyndon Mccracken MD [facility]   10/3/2025 11:20 AM Thuy Becker MD [facility]

## 2025-03-20 NOTE — PATIENT INSTRUCTIONS
The thyroid blood work is slightly overactive. It may need to be decreased over time but lets give it a bit more time. Continue the same levothyroxine and liothyronine.     If you decide to use the synthroid delivers program, let me know and I can send a new prescription to the [place] [facility] in [place].     We will repeat thyroid blood work in 3 months.     Ok to increase the metformin to twice daily.     Try vitamin B 12  500 -1000 mcg daily.     I agree with CoQ10 when on the statin.     Follow up in 6 months with blood work.     I will order the ferritin level since lab did not do it.

## 2025-04-21 ENCOUNTER — HOSPITAL ENCOUNTER (OUTPATIENT)
Dept: MRI IMAGING | Facility: HOSPITAL | Age: 50
Discharge: HOME | End: 2025-04-21
Payer: COMMERCIAL

## 2025-04-21 DIAGNOSIS — I67.1 INTRACRANIAL ANEURYSM: ICD-10-CM

## 2025-04-21 PROCEDURE — 70544 MR ANGIOGRAPHY HEAD W/O DYE: CPT

## 2025-04-24 ENCOUNTER — TELEPHONE (OUTPATIENT)
Age: 50
End: 2025-04-24

## 2025-05-15 NOTE — ASSESSMENT & PLAN NOTE
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 Episode was transient, lasting 1-2 hrs and has not recurred. During that time has blurry and double vision.   Reportedly normal ophthalmologic exam.   No family history of aneurysm or sudden death. Non-smoker.     Imaging:  MRA head 4/21/25: Stable tiny outpouching in the expected region of the origin of the right posterior communicating artery, which may represent a tiny infundibulum or aneurysm. No large vessel occlusion or high-grade stenosis identified.     Plan:  Reviewed images with patient and Dr. Mccracken. Stable, tiny aneurysm vs infundibulum noted in the right supraclinoid ICA.  Discussed natural history of aneurysms and risk of rupture and infundibulum which is an anatomic variant with no risk of rupture.  If this is a true aneurysm, given current size and location, risk of rupture is <1%/year per UCAS and <1%/5yrs per ISIUA.  Reviewed red flag s/s.  Follow up in 2 years with MRA head as joint appointment with Dr. Mccracken. If the MRA is too costly, and if a CTA is less so, ok to have a CTA head instead.   Call sooner with any questions or concerns.

## 2025-05-16 ENCOUNTER — OFFICE VISIT (OUTPATIENT)
Dept: NEUROSURGERY | Facility: CLINIC | Age: 50
End: 2025-05-16
Payer: COMMERCIAL

## 2025-05-16 VITALS
WEIGHT: 203 LBS | BODY MASS INDEX: 37.36 KG/M2 | RESPIRATION RATE: 18 BRPM | HEART RATE: 82 BPM | DIASTOLIC BLOOD PRESSURE: 72 MMHG | TEMPERATURE: 97.6 F | HEIGHT: 62 IN | SYSTOLIC BLOOD PRESSURE: 130 MMHG | OXYGEN SATURATION: 97 %

## 2025-05-16 DIAGNOSIS — I67.1 INTRACRANIAL ANEURYSM: Primary | ICD-10-CM

## 2025-05-16 PROCEDURE — 99214 OFFICE O/P EST MOD 30 MIN: CPT | Performed by: PHYSICIAN ASSISTANT

## 2025-05-16 NOTE — PROGRESS NOTES
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 Episode was transient, lasting 1-2 hrs and has not recurred. During that time has blurry and double vision.   Reportedly normal ophthalmologic exam.   No family history of aneurysm or sudden death. Non-smoker.     Imaging:  MRA head 25: Stable tiny outpouching in the expected region of the origin of the right posterior communicating artery, which may represent a tiny infundibulum or aneurysm. No large vessel occlusion or high-grade stenosis identified.     Plan:  Reviewed images with patient and Dr. Mccracken. Stable, tiny aneurysm vs infundibulum noted in the right supraclinoid ICA.  Discussed natural history of aneurysms and risk of rupture and infundibulum which is an anatomic variant with no risk of rupture.  If this is a true aneurysm, given current size and location, risk of rupture is <1%/year per UCAS and <1%/5yrs per ISIUA.  Reviewed red flag s/s.  Follow up in 2 years with MRA head as joint appointment with Dr. Mccracken. If the MRA is too costly, and if a CTA is less so, ok to have a CTA head instead.   Call sooner with any questions or concerns.             History of Present Illness     Mary Alice Willoughby is a 49 y.o. 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 This was incidentally noted on CTA done for transient left eye ptosis in 2023. This lasted for about 1-2 hrs then spontaneously resolved while in the ED. She left and came back the next day and a CTA was done. This has not recurred. No family history of aneurysm/sudden death, non-smoker, BP controlled. Saw  Ophthalmology with a reportedly normal exam. Since last seen, she is feeling well with no new neurologic complaints.     HPI     Review of Systems   Constitutional:  Negative for fatigue.   HENT:  Negative for tinnitus.    Eyes: Negative.  Visual disturbance: blurry -- needs glasses.   Respiratory: Negative.     Cardiovascular: Negative.    Gastrointestinal:  Negative for nausea and vomiting.   Endocrine: Negative.    Genitourinary: Negative.    Musculoskeletal:  Negative for gait problem.   Skin: Negative.    Allergic/Immunologic: Negative.    Neurological:  Positive for headaches (interemittent -- sinus). Negative for dizziness and speech difficulty. Numbness: bilateral hands -- carpal tunnel.  Hematological: Negative.    Psychiatric/Behavioral: Negative.       I have personally reviewed the MA's review of systems and made changes as necessary.    Past Medical History   Past Medical History:   Diagnosis Date    Asthma     induced by exercise or smoke.    Deviated septum     GERD (gastroesophageal reflux disease)     Intracranial aneurysm 2023    Nasal polyps      Past Surgical History:   Procedure Laterality Date     SECTION      X 2     Family History   Problem Relation Age of Onset    Hypertension Mother     Hashimoto's thyroiditis Mother     Hypertension Father     Sleep apnea Father     Hypertension Brother     Thyroid disease unspecified Brother     Sleep apnea Brother     Hyperlipidemia Brother     Autism spectrum disorder Brother         suspected    No Known Problems Son     No Known Problems Daughter     Diabetes type II Maternal Grandmother      she reports that she has never smoked. She has never used smokeless tobacco. She reports that she does not drink alcohol and does not use drugs.  Current Outpatient Medications   Medication Instructions    Advair Diskus 100-50 MCG/ACT inhaler 1 puff, Every 12 hours    albuterol (PROVENTIL HFA,VENTOLIN HFA) 90 mcg/act inhaler No dose, route, or  "frequency recorded.    Ascorbic Acid (Vitamin C) 500 MG CAPS Daily    atorvastatin (LIPITOR) 10 mg, Oral, Daily    Biotin w/ Vitamins C & E (HAIR SKIN & NAILS GUMMIES PO) Daily    budesonide-formoterol (SYMBICORT) 160-4.5 mcg/act inhaler 2 puffs, 2 times daily    Cholecalciferol (Vitamin D3) 50 MCG (2000 UT) TABS 8855-4009 units daily    COLLAGEN PO Take by mouth Powder daily    ergocalciferol (ERGOCALCIFEROL) 50,000 Units, Oral, Weekly    ferrous gluconate (FERGON) 324 mg, 2 times weekly    fish oil 1,000 mg, Daily    Lactobacillus (PROBIOTIC ACIDOPHILUS PO) Daily    levothyroxine (Synthroid) 100 mcg tablet 1 tablet 6 days a week and 2 tablets on Sunday    liothyronine (CYTOMEL) 5 mcg, Oral, Daily    losartan (COZAAR) 50 mg, Daily    Magnesium 200 MG TABS Daily    metFORMIN (GLUCOPHAGE-XR) 500 mg, Oral, 2 times daily with meals    mometasone (NASONEX) 50 mcg/act nasal spray Every 24 hours    Multiple Vitamins-Minerals (MULTIVITAMIN WOMEN PO) Daily    omeprazole (PRILOSEC) 20 mg, Daily    zinc gluconate 50 mg, Daily   Allergies[1]   Objective   /72 (BP Location: Left arm, Patient Position: Sitting, Cuff Size: Standard)   Pulse 82   Temp 97.6 °F (36.4 °C) (Temporal)   Resp 18   Ht 5' 2\" (1.575 m)   Wt 92.1 kg (203 lb)   SpO2 97%   BMI 37.13 kg/m²     Physical Exam  Vitals reviewed.   Constitutional:       General: She is awake.      Appearance: Normal appearance.   HENT:      Head: Normocephalic and atraumatic.     Eyes:      Extraocular Movements: Extraocular movements intact.      Conjunctiva/sclera: Conjunctivae normal.      Pupils: Pupils are equal, round, and reactive to light.       Cardiovascular:      Rate and Rhythm: Normal rate.   Pulmonary:      Effort: Pulmonary effort is normal.     Skin:     General: Skin is warm and dry.     Neurological:      Mental Status: She is alert.      Deep Tendon Reflexes:      Reflex Scores:       Bicep reflexes are 2+ on the right side and 2+ on the left side.   "     Brachioradialis reflexes are 2+ on the right side and 2+ on the left side.       Patellar reflexes are 2+ on the right side and 2+ on the left side.    Psychiatric:         Attention and Perception: Attention and perception normal.         Mood and Affect: Mood and affect normal.         Speech: Speech normal.         Behavior: Behavior normal. Behavior is cooperative.         Thought Content: Thought content normal.         Cognition and Memory: Cognition and memory normal.         Judgment: Judgment normal.       Neurological Exam  Mental Status  Awake and alert. Oriented to person, place, time and situation. Memory is normal. Recent and remote memory are intact. Speech is normal. Language is fluent with no aphasia. Attention and concentration are normal. Fund of knowledge is appropriate for level of education.    Cranial Nerves  CN III, IV, VI: Extraocular movements intact bilaterally. Pupils equal round and reactive to light bilaterally.  CN V:  Right: Facial sensation is normal.  Left: Facial sensation is normal on the left.  CN VII: Full and symmetric facial movement.  CN VIII: Hearing is normal.  CN XI:  Right: Trapezius strength is normal.  Left: Trapezius strength is normal.  CN XII: Tongue midline without atrophy or fasciculations.    Motor  Normal muscle bulk throughout. Normal muscle tone. No pronator drift.                                             Right                     Left  Hip flexion                              5                          5  Plantarflexion                         5                          5  Dorsiflexion                            5                          5                                             Right                     Left   Biceps                                   5                          5   Triceps                                  5                          5   5/5 bilaterally .    Sensory  Light touch is normal in upper and lower extremities.      Reflexes                                            Right                      Left  Brachioradialis                    2+                         2+  Biceps                                 2+                         2+  Patellar                                2+                         2+    Right pathological reflexes: Halley's absent.  Left pathological reflexes: Halley's absent.    Coordination  Right: Finger-to-nose normal.Left: Finger-to-nose normal.    Gait  Casual gait is normal including stance, stride, and arm swing.                     [1]   Allergies  Allergen Reactions    Sulfamethoxazole-Trimethoprim Hives

## 2025-06-18 DIAGNOSIS — E06.3 HYPOTHYROIDISM DUE TO HASHIMOTO'S THYROIDITIS: ICD-10-CM

## 2025-06-18 RX ORDER — LEVOTHYROXINE SODIUM 100 UG/1
TABLET ORAL
Qty: 90 TABLET | Refills: 1 | Status: SHIPPED | OUTPATIENT
Start: 2025-06-18